# Patient Record
Sex: MALE | HISPANIC OR LATINO | Employment: FULL TIME | URBAN - METROPOLITAN AREA
[De-identification: names, ages, dates, MRNs, and addresses within clinical notes are randomized per-mention and may not be internally consistent; named-entity substitution may affect disease eponyms.]

---

## 2021-09-16 ENCOUNTER — HOSPITAL ENCOUNTER (OUTPATIENT)
Facility: MEDICAL CENTER | Age: 26
End: 2021-09-16
Attending: PHYSICIAN ASSISTANT
Payer: COMMERCIAL

## 2021-09-16 ENCOUNTER — OFFICE VISIT (OUTPATIENT)
Dept: URGENT CARE | Facility: CLINIC | Age: 26
End: 2021-09-16
Payer: COMMERCIAL

## 2021-09-16 VITALS
OXYGEN SATURATION: 96 % | DIASTOLIC BLOOD PRESSURE: 76 MMHG | SYSTOLIC BLOOD PRESSURE: 130 MMHG | WEIGHT: 286 LBS | BODY MASS INDEX: 44.89 KG/M2 | HEART RATE: 99 BPM | RESPIRATION RATE: 16 BRPM | TEMPERATURE: 99 F | HEIGHT: 67 IN

## 2021-09-16 DIAGNOSIS — J98.8 RTI (RESPIRATORY TRACT INFECTION): ICD-10-CM

## 2021-09-16 DIAGNOSIS — R05.9 COUGH: ICD-10-CM

## 2021-09-16 LAB
COVID ORDER STATUS COVID19: NORMAL
SARS-COV-2 RNA RESP QL NAA+PROBE: DETECTED
SPECIMEN SOURCE: ABNORMAL

## 2021-09-16 PROCEDURE — 99204 OFFICE O/P NEW MOD 45 MIN: CPT | Performed by: PHYSICIAN ASSISTANT

## 2021-09-16 PROCEDURE — U0003 INFECTIOUS AGENT DETECTION BY NUCLEIC ACID (DNA OR RNA); SEVERE ACUTE RESPIRATORY SYNDROME CORONAVIRUS 2 (SARS-COV-2) (CORONAVIRUS DISEASE [COVID-19]), AMPLIFIED PROBE TECHNIQUE, MAKING USE OF HIGH THROUGHPUT TECHNOLOGIES AS DESCRIBED BY CMS-2020-01-R: HCPCS

## 2021-09-16 PROCEDURE — U0005 INFEC AGEN DETEC AMPLI PROBE: HCPCS

## 2021-09-16 RX ORDER — BENZONATATE 100 MG/1
100 CAPSULE ORAL 3 TIMES DAILY PRN
Qty: 60 CAPSULE | Refills: 0 | Status: SHIPPED | OUTPATIENT
Start: 2021-09-16 | End: 2021-11-11

## 2021-09-16 RX ORDER — ALBUTEROL SULFATE 90 UG/1
2 AEROSOL, METERED RESPIRATORY (INHALATION) EVERY 6 HOURS PRN
Qty: 8.5 G | Refills: 0 | Status: SHIPPED | OUTPATIENT
Start: 2021-09-16 | End: 2021-11-11

## 2021-09-16 RX ORDER — AZITHROMYCIN 250 MG/1
TABLET, FILM COATED ORAL
Qty: 6 TABLET | Refills: 0 | Status: SHIPPED | OUTPATIENT
Start: 2021-09-16 | End: 2021-11-11

## 2021-09-16 ASSESSMENT — ENCOUNTER SYMPTOMS
DIZZINESS: 0
HEADACHES: 0
DIARRHEA: 1
MYALGIAS: 0
SHORTNESS OF BREATH: 0
RHINORRHEA: 1
PALPITATIONS: 0
CHILLS: 1
VOMITING: 1
SORE THROAT: 0
WHEEZING: 0
COUGH: 1
FEVER: 1

## 2021-09-16 NOTE — PROGRESS NOTES
Subjective     Angelito Rodriguez is a 26 y.o. male who presents with Cough (coughing up something yellow/orange, x1 week )            Cough  This is a new problem. The current episode started in the past 7 days. The problem has been unchanged. The problem occurs every few minutes. The cough is productive of sputum. Associated symptoms include chills, a fever, nasal congestion and rhinorrhea. Pertinent negatives include no chest pain, ear pain, headaches, myalgias, sore throat, shortness of breath or wheezing. He has tried OTC cough suppressant for the symptoms. The treatment provided mild relief. His past medical history is significant for pneumonia. There is no history of asthma.       PMH:  has a past medical history of Cold and Tonsillitis.  MEDS:   Current Outpatient Medications:   •  azithromycin (ZITHROMAX) 250 MG Tab, Z-gio, Use as directed., Disp: 6 Tablet, Rfl: 0  •  albuterol 108 (90 Base) MCG/ACT Aero Soln inhalation aerosol, Inhale 2 Puffs every 6 hours as needed for Shortness of Breath., Disp: 8.5 g, Rfl: 0  •  benzonatate (TESSALON) 100 MG Cap, Take 1 Capsule by mouth 3 times a day as needed for Cough., Disp: 60 Capsule, Rfl: 0  •  Guaifenesin-Codeine (CHERATUSSIN AC PO), Take  by mouth as needed., Disp: , Rfl:   •  AMOXICILLIN PO, Take  by mouth 2 Times a Day., Disp: , Rfl:   ALLERGIES: No Known Allergies  SURGHX:   Past Surgical History:   Procedure Laterality Date   • TONSILLECTOMY AND ADENOIDECTOMY  2/3/2011    [facility]   • EXAM UNDER ANESTHESIA  2/3/2011    [facility]     SOCHX:  reports that he has never smoked. He does not have any smokeless tobacco history on file. He reports that he does not drink alcohol and does not use drugs.  FH: family history includes Cancer in his unknown relative; Hypertension in his unknown relative; Stroke in his unknown relative.    Review of Systems  "  Constitutional: Positive for chills, fever and malaise/fatigue.   HENT: Positive for rhinorrhea. Negative for congestion, ear pain and sore throat.    Respiratory: Positive for cough. Negative for shortness of breath and wheezing.    Cardiovascular: Negative for chest pain, palpitations and leg swelling.   Gastrointestinal: Positive for diarrhea and vomiting.   Musculoskeletal: Negative for myalgias.   Neurological: Negative for dizziness and headaches.       Medications, Allergies, and current problem list reviewed today in Epic           Objective     /76 (BP Location: Left arm, Patient Position: Sitting, BP Cuff Size: Adult long)   Pulse 99   Temp 37.2 °C (99 °F) (Temporal)   Resp 16   Ht 1.702 m (5' 7\")   Wt (!) 130 kg (286 lb)   SpO2 96%   BMI 44.79 kg/m²      Physical Exam  Vitals and nursing note reviewed.   Constitutional:       General: He is not in acute distress.     Appearance: Normal appearance. He is well-developed. He is not ill-appearing, toxic-appearing or diaphoretic.   HENT:      Head: Normocephalic and atraumatic.      Right Ear: Tympanic membrane, ear canal and external ear normal.      Left Ear: Tympanic membrane, ear canal and external ear normal.      Nose: Nose normal. No congestion or rhinorrhea.      Mouth/Throat:      Mouth: Mucous membranes are moist.      Pharynx: Oropharynx is clear. No oropharyngeal exudate or posterior oropharyngeal erythema.   Eyes:      General:         Right eye: No discharge.         Left eye: No discharge.      Conjunctiva/sclera: Conjunctivae normal.   Cardiovascular:      Rate and Rhythm: Normal rate and regular rhythm.      Pulses: Normal pulses.      Heart sounds: Normal heart sounds. No murmur heard.     Pulmonary:      Effort: Pulmonary effort is normal. No respiratory distress.      Breath sounds: Normal breath sounds. No wheezing, rhonchi or rales.   Chest:      Chest wall: No tenderness.   Musculoskeletal:         General: No swelling or " tenderness.      Cervical back: Normal range of motion and neck supple.      Right lower leg: No edema.      Left lower leg: No edema.   Lymphadenopathy:      Cervical: No cervical adenopathy.   Skin:     General: Skin is warm and dry.   Neurological:      Mental Status: He is alert and oriented to person, place, and time.   Psychiatric:         Mood and Affect: Mood normal.         Behavior: Behavior normal.         Thought Content: Thought content normal.         Judgment: Judgment normal.                             Assessment & Plan        1. Cough  albuterol 108 (90 Base) MCG/ACT Aero Soln inhalation aerosol    benzonatate (TESSALON) 100 MG Cap    SARS-CoV-2, PCR (In-House): Collect NP OR nasal swab in VTM   2. RTI (respiratory tract infection)  azithromycin (ZITHROMAX) 250 MG Tab    albuterol 108 (90 Base) MCG/ACT Aero Soln inhalation aerosol    benzonatate (TESSALON) 100 MG Cap     Productive cough for 1 week.  No significant shortness of breath, wheezing, chest pain.  Started with fevers, vomiting and diarrhea but those symptoms have resolved.  No loss of taste or smell.  PO2 96% vital signs normal.  Lungs clear bilateral without wheezes rhonchi or rales.  Start albuterol and perles now.  Covid testing initiated.  If Covid testing negative he may begin antibiotic for bacterial respiratory tract infection.    Patient was given a contingent antibiotic prescription to fill and use as directed if symptoms progressed as discussed and agreed upon.    Return to clinic or go to ED if symptoms worsen or persist. Indications for ED discussed at length. Patient/Parent/Guardian voices understanding. Follow-up with your primary care provider in 3-5 days. Red flag symptoms discussed. All side effects of medication discussed including allergic response, GI upset, tendon injury, rash, sedation etc.    Please note that this dictation was created using voice recognition software. I have made every reasonable attempt to  correct obvious errors, but I expect that there are errors of grammar and possibly content that I did not discover before finalizing the note.

## 2021-11-11 ENCOUNTER — APPOINTMENT (OUTPATIENT)
Dept: RADIOLOGY | Facility: MEDICAL CENTER | Age: 26
End: 2021-11-11
Attending: EMERGENCY MEDICINE
Payer: COMMERCIAL

## 2021-11-11 ENCOUNTER — APPOINTMENT (OUTPATIENT)
Dept: CARDIOLOGY | Facility: MEDICAL CENTER | Age: 26
End: 2021-11-11
Attending: EMERGENCY MEDICINE
Payer: COMMERCIAL

## 2021-11-11 ENCOUNTER — HOSPITAL ENCOUNTER (OUTPATIENT)
Facility: MEDICAL CENTER | Age: 26
End: 2021-11-13
Attending: EMERGENCY MEDICINE | Admitting: STUDENT IN AN ORGANIZED HEALTH CARE EDUCATION/TRAINING PROGRAM
Payer: COMMERCIAL

## 2021-11-11 DIAGNOSIS — I40.9 ACUTE MYOCARDITIS, UNSPECIFIED MYOCARDITIS TYPE: ICD-10-CM

## 2021-11-11 DIAGNOSIS — R07.9 CHEST PAIN, UNSPECIFIED TYPE: ICD-10-CM

## 2021-11-11 PROBLEM — E66.01 CLASS 3 SEVERE OBESITY IN ADULT (HCC): Status: ACTIVE | Noted: 2021-11-11

## 2021-11-11 PROBLEM — I40.8 OTHER ACUTE MYOCARDITIS: Status: ACTIVE | Noted: 2021-11-11

## 2021-11-11 PROBLEM — Z86.16 HISTORY OF COVID-19: Status: ACTIVE | Noted: 2021-11-11

## 2021-11-11 LAB
ALBUMIN SERPL BCP-MCNC: 4.6 G/DL (ref 3.2–4.9)
ALBUMIN/GLOB SERPL: 1.5 G/DL
ALP SERPL-CCNC: 66 U/L (ref 30–99)
ALT SERPL-CCNC: 44 U/L (ref 2–50)
AMPHET UR QL SCN: NEGATIVE
ANION GAP SERPL CALC-SCNC: 13 MMOL/L (ref 7–16)
AST SERPL-CCNC: 44 U/L (ref 12–45)
BARBITURATES UR QL SCN: NEGATIVE
BASOPHILS # BLD AUTO: 0.5 % (ref 0–1.8)
BASOPHILS # BLD: 0.04 K/UL (ref 0–0.12)
BENZODIAZ UR QL SCN: NEGATIVE
BILIRUB SERPL-MCNC: 0.4 MG/DL (ref 0.1–1.5)
BUN SERPL-MCNC: 10 MG/DL (ref 8–22)
BZE UR QL SCN: NEGATIVE
CALCIUM SERPL-MCNC: 9.1 MG/DL (ref 8.5–10.5)
CANNABINOIDS UR QL SCN: NEGATIVE
CHLORIDE SERPL-SCNC: 102 MMOL/L (ref 96–112)
CO2 SERPL-SCNC: 26 MMOL/L (ref 20–33)
CREAT SERPL-MCNC: 0.64 MG/DL (ref 0.5–1.4)
D DIMER PPP IA.FEU-MCNC: 0.32 UG/ML (FEU) (ref 0–0.5)
EKG IMPRESSION: NORMAL
EOSINOPHIL # BLD AUTO: 0.17 K/UL (ref 0–0.51)
EOSINOPHIL NFR BLD: 2 % (ref 0–6.9)
ERYTHROCYTE [DISTWIDTH] IN BLOOD BY AUTOMATED COUNT: 41.5 FL (ref 35.9–50)
GLOBULIN SER CALC-MCNC: 3.1 G/DL (ref 1.9–3.5)
GLUCOSE SERPL-MCNC: 138 MG/DL (ref 65–99)
HCT VFR BLD AUTO: 44.9 % (ref 42–52)
HGB BLD-MCNC: 15.4 G/DL (ref 14–18)
IMM GRANULOCYTES # BLD AUTO: 0.04 K/UL (ref 0–0.11)
IMM GRANULOCYTES NFR BLD AUTO: 0.5 % (ref 0–0.9)
LV EJECT FRACT  99904: 55
LV EJECT FRACT MOD 2C 99903: 62.79
LV EJECT FRACT MOD 4C 99902: 69.87
LV EJECT FRACT MOD BP 99901: 67.84
LYMPHOCYTES # BLD AUTO: 1.83 K/UL (ref 1–4.8)
LYMPHOCYTES NFR BLD: 21.5 % (ref 22–41)
MCH RBC QN AUTO: 29.9 PG (ref 27–33)
MCHC RBC AUTO-ENTMCNC: 34.3 G/DL (ref 33.7–35.3)
MCV RBC AUTO: 87.2 FL (ref 81.4–97.8)
METHADONE UR QL SCN: NEGATIVE
MONOCYTES # BLD AUTO: 1.02 K/UL (ref 0–0.85)
MONOCYTES NFR BLD AUTO: 12 % (ref 0–13.4)
NEUTROPHILS # BLD AUTO: 5.4 K/UL (ref 1.82–7.42)
NEUTROPHILS NFR BLD: 63.5 % (ref 44–72)
NRBC # BLD AUTO: 0 K/UL
NRBC BLD-RTO: 0 /100 WBC
NT-PROBNP SERPL IA-MCNC: 204 PG/ML (ref 0–125)
OPIATES UR QL SCN: NEGATIVE
OXYCODONE UR QL SCN: NEGATIVE
PCP UR QL SCN: NEGATIVE
PLATELET # BLD AUTO: 173 K/UL (ref 164–446)
PMV BLD AUTO: 10.9 FL (ref 9–12.9)
POTASSIUM SERPL-SCNC: 3.9 MMOL/L (ref 3.6–5.5)
PROPOXYPH UR QL SCN: NEGATIVE
PROT SERPL-MCNC: 7.7 G/DL (ref 6–8.2)
RBC # BLD AUTO: 5.15 M/UL (ref 4.7–6.1)
SODIUM SERPL-SCNC: 141 MMOL/L (ref 135–145)
TROPONIN T SERPL-MCNC: 666 NG/L (ref 6–19)
TROPONIN T SERPL-MCNC: 834 NG/L (ref 6–19)
WBC # BLD AUTO: 8.5 K/UL (ref 4.8–10.8)

## 2021-11-11 PROCEDURE — 93005 ELECTROCARDIOGRAM TRACING: CPT

## 2021-11-11 PROCEDURE — 83880 ASSAY OF NATRIURETIC PEPTIDE: CPT

## 2021-11-11 PROCEDURE — 99220 PR INITIAL OBSERVATION CARE,LEVL III: CPT | Performed by: STUDENT IN AN ORGANIZED HEALTH CARE EDUCATION/TRAINING PROGRAM

## 2021-11-11 PROCEDURE — 84484 ASSAY OF TROPONIN QUANT: CPT

## 2021-11-11 PROCEDURE — 93306 TTE W/DOPPLER COMPLETE: CPT

## 2021-11-11 PROCEDURE — 85379 FIBRIN DEGRADATION QUANT: CPT

## 2021-11-11 PROCEDURE — 99204 OFFICE O/P NEW MOD 45 MIN: CPT | Performed by: INTERNAL MEDICINE

## 2021-11-11 PROCEDURE — 99285 EMERGENCY DEPT VISIT HI MDM: CPT

## 2021-11-11 PROCEDURE — 93306 TTE W/DOPPLER COMPLETE: CPT | Mod: 26 | Performed by: INTERNAL MEDICINE

## 2021-11-11 PROCEDURE — 93005 ELECTROCARDIOGRAM TRACING: CPT | Performed by: EMERGENCY MEDICINE

## 2021-11-11 PROCEDURE — 85025 COMPLETE CBC W/AUTO DIFF WBC: CPT

## 2021-11-11 PROCEDURE — 80307 DRUG TEST PRSMV CHEM ANLYZR: CPT

## 2021-11-11 PROCEDURE — G0378 HOSPITAL OBSERVATION PER HR: HCPCS

## 2021-11-11 PROCEDURE — 71045 X-RAY EXAM CHEST 1 VIEW: CPT

## 2021-11-11 PROCEDURE — 80053 COMPREHEN METABOLIC PANEL: CPT

## 2021-11-11 PROCEDURE — 36415 COLL VENOUS BLD VENIPUNCTURE: CPT

## 2021-11-11 RX ORDER — LABETALOL HYDROCHLORIDE 5 MG/ML
10 INJECTION, SOLUTION INTRAVENOUS EVERY 4 HOURS PRN
Status: DISCONTINUED | OUTPATIENT
Start: 2021-11-11 | End: 2021-11-13 | Stop reason: HOSPADM

## 2021-11-11 RX ORDER — IBUPROFEN 200 MG
400 TABLET ORAL EVERY 6 HOURS PRN
Status: ON HOLD | COMMUNITY
End: 2021-11-13

## 2021-11-11 RX ORDER — POLYETHYLENE GLYCOL 3350 17 G/17G
1 POWDER, FOR SOLUTION ORAL
Status: DISCONTINUED | OUTPATIENT
Start: 2021-11-11 | End: 2021-11-13 | Stop reason: HOSPADM

## 2021-11-11 RX ORDER — BISACODYL 10 MG
10 SUPPOSITORY, RECTAL RECTAL
Status: DISCONTINUED | OUTPATIENT
Start: 2021-11-11 | End: 2021-11-13 | Stop reason: HOSPADM

## 2021-11-11 RX ORDER — ONDANSETRON 4 MG/1
4 TABLET, ORALLY DISINTEGRATING ORAL EVERY 4 HOURS PRN
Status: DISCONTINUED | OUTPATIENT
Start: 2021-11-11 | End: 2021-11-13 | Stop reason: HOSPADM

## 2021-11-11 RX ORDER — ACETAMINOPHEN 500 MG
1000 TABLET ORAL EVERY 6 HOURS PRN
COMMUNITY
End: 2022-03-11

## 2021-11-11 RX ORDER — ONDANSETRON 2 MG/ML
4 INJECTION INTRAMUSCULAR; INTRAVENOUS EVERY 4 HOURS PRN
Status: DISCONTINUED | OUTPATIENT
Start: 2021-11-11 | End: 2021-11-13 | Stop reason: HOSPADM

## 2021-11-11 RX ORDER — ENALAPRILAT 1.25 MG/ML
1.25 INJECTION INTRAVENOUS EVERY 6 HOURS PRN
Status: DISCONTINUED | OUTPATIENT
Start: 2021-11-11 | End: 2021-11-13 | Stop reason: HOSPADM

## 2021-11-11 RX ORDER — ACETAMINOPHEN 325 MG/1
650 TABLET ORAL EVERY 6 HOURS PRN
Status: DISCONTINUED | OUTPATIENT
Start: 2021-11-11 | End: 2021-11-13 | Stop reason: HOSPADM

## 2021-11-11 RX ORDER — CLONIDINE HYDROCHLORIDE 0.1 MG/1
0.1 TABLET ORAL EVERY 6 HOURS PRN
Status: DISCONTINUED | OUTPATIENT
Start: 2021-11-11 | End: 2021-11-13 | Stop reason: HOSPADM

## 2021-11-11 RX ORDER — ALBUTEROL SULFATE 90 UG/1
2 AEROSOL, METERED RESPIRATORY (INHALATION) EVERY 6 HOURS PRN
COMMUNITY
End: 2021-12-09

## 2021-11-11 RX ORDER — AMOXICILLIN 250 MG
2 CAPSULE ORAL 2 TIMES DAILY
Status: DISCONTINUED | OUTPATIENT
Start: 2021-11-11 | End: 2021-11-13 | Stop reason: HOSPADM

## 2021-11-11 ASSESSMENT — COGNITIVE AND FUNCTIONAL STATUS - GENERAL
SUGGESTED CMS G CODE MODIFIER DAILY ACTIVITY: CH
SUGGESTED CMS G CODE MODIFIER MOBILITY: CH
MOBILITY SCORE: 24
DAILY ACTIVITIY SCORE: 24

## 2021-11-11 ASSESSMENT — PATIENT HEALTH QUESTIONNAIRE - PHQ9
2. FEELING DOWN, DEPRESSED, IRRITABLE, OR HOPELESS: NOT AT ALL
1. LITTLE INTEREST OR PLEASURE IN DOING THINGS: NOT AT ALL
SUM OF ALL RESPONSES TO PHQ9 QUESTIONS 1 AND 2: 0

## 2021-11-11 ASSESSMENT — ENCOUNTER SYMPTOMS
WEAKNESS: 1
MYALGIAS: 1
FEVER: 1

## 2021-11-11 NOTE — ED TRIAGE NOTES
Vitals:    11/11/21 1024   BP: 129/81   Pulse: 89   Resp: 18   Temp: 36.3 °C (97.4 °F)   SpO2: 94%     Chief Complaint   Patient presents with   • Chest Pain     after receiving covid vaccine     Pt got the Moderna covid vaccine on Tuesday. He felt pretty ill for a couple days afterward but now has been having intermittent chest pain that he describes as sharp/shooting pain. Pt currently is c/o of a dull ache in his chest.     EKG completed.

## 2021-11-11 NOTE — ASSESSMENT & PLAN NOTE
BMI 45  Life style modifications including healthy plant based diet and regular exercise recommended

## 2021-11-11 NOTE — ED NOTES
"Med rec completed per Pt at bedside.  Allergies reviewed with Pt. No known drug allergies.  Pt denies using any prescription medications aside from an albuterol rescue inhaler, which he states he was \"only prescribed for COVID.\"  No oral antibiotics in last 30 days.  Pt's preferred pharmacy: [facility] on [address]  "

## 2021-11-11 NOTE — ED PROVIDER NOTES
ED Provider Note    ER PROVIDER NOTE        CHIEF COMPLAINT  Chief Complaint   Patient presents with   • Chest Pain     after receiving covid vaccine       HPI  Angelito Rodriguez is a 26 y.o. male who presents to the emergency department complaining of chest pain.  Patient reports he began having some chest pain yesterday, did feel feverish as well, this resolved through the day however the chest pain then returned yesterday evening.  He describes it as both a sharp and pressure pain that is central, there is no radiation, it is not positional, does not seem to matter if he lays down or sits up.  It is not pleuritic, and does not seem to be exertional either.  He states it seemed to actually be getting better last night and then returned again this morning.  And is starting to improve again now.  He did receive the Covid vaccination with Moderna on Tuesday, shot #1    No cough, congestion or shortness of breath.  No leg pain or swelling.  No abdominal pain, nausea vomiting or diarrhea    REVIEW OF SYSTEMS  Pertinent positives include chest pain. Pertinent negatives include no vomiting. See HPI for details. All other systems reviewed and are negative.    PAST MEDICAL HISTORY   has a past medical history of Cold and Tonsillitis.    SURGICAL HISTORY   has a past surgical history that includes tonsillectomy and adenoidectomy (2/3/2011) and exam under anesthesia (2/3/2011).    FAMILY HISTORY  Family History   Problem Relation Age of Onset   • Hypertension Unknown    • Stroke Unknown    • Cancer Unknown        SOCIAL HISTORY  Social History     Socioeconomic History   • Marital status: Single     Spouse name: Not on file   • Number of children: Not on file   • Years of education: Not on file   • Highest education level: Not on file   Occupational History   • Not on file   Tobacco Use   • Smoking status: Never Smoker   • Smokeless tobacco: Not on file   Substance and Sexual Activity   • Alcohol use: No   • Drug use: No  "  • Sexual activity: Not on file   Other Topics Concern   • Not on file   Social History Narrative   • Not on file     Social Determinants of Health     Financial Resource Strain:    • Difficulty of Paying Living Expenses: Not on file   Food Insecurity:    • Worried About Running Out of Food in the Last Year: Not on file   • Ran Out of Food in the Last Year: Not on file   Transportation Needs:    • Lack of Transportation (Medical): Not on file   • Lack of Transportation (Non-Medical): Not on file   Physical Activity:    • Days of Exercise per Week: Not on file   • Minutes of Exercise per Session: Not on file   Stress:    • Feeling of Stress : Not on file   Social Connections:    • Frequency of Communication with Friends and Family: Not on file   • Frequency of Social Gatherings with Friends and Family: Not on file   • Attends Hindu Services: Not on file   • Active Member of Clubs or Organizations: Not on file   • Attends Club or Organization Meetings: Not on file   • Marital Status: Not on file   Intimate Partner Violence:    • Fear of Current or Ex-Partner: Not on file   • Emotionally Abused: Not on file   • Physically Abused: Not on file   • Sexually Abused: Not on file   Housing Stability:    • Unable to Pay for Housing in the Last Year: Not on file   • Number of Places Lived in the Last Year: Not on file   • Unstable Housing in the Last Year: Not on file      Social History     Substance and Sexual Activity   Drug Use No       CURRENT MEDICATIONS  Home Medications     Reviewed by Marleny Sneed R.N. (Registered Nurse) on 11/11/21 at 1036  Med List Status: Complete   Medication Last Dose Status        Patient Iván Taking any Medications                       ALLERGIES  No Known Allergies    PHYSICAL EXAM  VITAL SIGNS: /67   Pulse 91   Temp 36.3 °C (97.4 °F) (Temporal)   Resp 16   Ht 1.702 m (5' 7\")   Wt (!) 133 kg (292 lb 8.8 oz)   SpO2 99%   BMI 45.82 kg/m²   Pulse ox interpretation: I " interpret this pulse ox as normal.    Constitutional: Alert in no apparent distress.  HENT: No signs of trauma, Bilateral external ears normal, Nose normal.   Eyes: Pupils are equal and reactive, Conjunctiva normal, Non-icteric.   Neck: Normal range of motion, No tenderness, Supple, No stridor.   Lymphatic: No lymphadenopathy noted.   Cardiovascular: Regular rate and rhythm, no murmurs.   Thorax & Lungs: Normal breath sounds, No respiratory distress, No wheezing, No chest tenderness.   Abdomen: Bowel sounds normal, Soft, No tenderness, No masses, No pulsatile masses. No peritoneal signs.  Skin: Warm, Dry, No erythema, No rash.   Back: No bony tenderness, No CVA tenderness.   Extremities: Intact distal pulses, No edema, No tenderness, No cyanosis, Negative Zana's sign.  Musculoskeletal: Good range of motion in all major joints. No tenderness to palpation or major deformities noted.   Neurologic: Alert , Normal motor function, Normal sensory function, No focal deficits noted.   Psychiatric: Affect normal, Judgment normal, Mood normal.     DIAGNOSTIC STUDIES / PROCEDURES    EKG  Results for orders placed or performed during the hospital encounter of 11/11/21   CBC with Differential   Result Value Ref Range    WBC 8.5 4.8 - 10.8 K/uL    RBC 5.15 4.70 - 6.10 M/uL    Hemoglobin 15.4 14.0 - 18.0 g/dL    Hematocrit 44.9 42.0 - 52.0 %    MCV 87.2 81.4 - 97.8 fL    MCH 29.9 27.0 - 33.0 pg    MCHC 34.3 33.7 - 35.3 g/dL    RDW 41.5 35.9 - 50.0 fL    Platelet Count 173 164 - 446 K/uL    MPV 10.9 9.0 - 12.9 fL    Neutrophils-Polys 63.50 44.00 - 72.00 %    Lymphocytes 21.50 (L) 22.00 - 41.00 %    Monocytes 12.00 0.00 - 13.40 %    Eosinophils 2.00 0.00 - 6.90 %    Basophils 0.50 0.00 - 1.80 %    Immature Granulocytes 0.50 0.00 - 0.90 %    Nucleated RBC 0.00 /100 WBC    Neutrophils (Absolute) 5.40 1.82 - 7.42 K/uL    Lymphs (Absolute) 1.83 1.00 - 4.80 K/uL    Monos (Absolute) 1.02 (H) 0.00 - 0.85 K/uL    Eos (Absolute) 0.17 0.00 -  0.51 K/uL    Baso (Absolute) 0.04 0.00 - 0.12 K/uL    Immature Granulocytes (abs) 0.04 0.00 - 0.11 K/uL    NRBC (Absolute) 0.00 K/uL   Complete Metabolic Panel (CMP)   Result Value Ref Range    Sodium 141 135 - 145 mmol/L    Potassium 3.9 3.6 - 5.5 mmol/L    Chloride 102 96 - 112 mmol/L    Co2 26 20 - 33 mmol/L    Anion Gap 13.0 7.0 - 16.0    Glucose 138 (H) 65 - 99 mg/dL    Bun 10 8 - 22 mg/dL    Creatinine 0.64 0.50 - 1.40 mg/dL    Calcium 9.1 8.5 - 10.5 mg/dL    AST(SGOT) 44 12 - 45 U/L    ALT(SGPT) 44 2 - 50 U/L    Alkaline Phosphatase 66 30 - 99 U/L    Total Bilirubin 0.4 0.1 - 1.5 mg/dL    Albumin 4.6 3.2 - 4.9 g/dL    Total Protein 7.7 6.0 - 8.2 g/dL    Globulin 3.1 1.9 - 3.5 g/dL    A-G Ratio 1.5 g/dL   Troponin   Result Value Ref Range    Troponin T 666 (H) 6 - 19 ng/L   ESTIMATED GFR   Result Value Ref Range    GFR If African American >60 >60 mL/min/1.73 m 2    GFR If Non African American >60 >60 mL/min/1.73 m 2   EKG (NOW)   Result Value Ref Range    Report       [facility] Emergency Dept.    Test Date:  2021  Pt Name:    JETT SHUKLA        Department: ER  MRN:        [id number]                      Room:  Gender:     Male                         Technician: [id number]  :        1995                   Requested By:ER TRIAGE PROTOCOL  Order #:    [id number]                    Reading MD: LIAT BERNAL MD    Measurements  Intervals                                Axis  Rate:       84                           P:          37  WI:         144                          QRS:        14  QRSD:       86                           T:          15  QT:         348  QTc:        412    Interpretive Statements  SINUS RHYTHM  No previous ECG available for comparison  Electronically Signed On 2021 13:05:43 PST by LIAT BERNAL MD           RADIOLOGY  DX-CHEST-PORTABLE (1 VIEW)   Final Result      No acute cardiopulmonary abnormality.         EC-ECHOCARDIOGRAM COMPLETE W/O CONT     (Results Pending)     The radiologist's interpretation of all radiological studies have been reviewed and images independently viewed by me.    COURSE & MEDICAL DECISION MAKING  Nursing notes, VS, PMSFHx reviewed in chart.    12:56 PM Patient seen and examined at bedside.  Patient had labs drawn in triage, troponin at 666, ECG is unremarkable, given the symptoms seems to likely be myocarditis potentially from his vaccination      1:05 PM  Cardiology is paged, ordered for echo  I discussed the case with Dr. Muñiz,  Agrees with admission of the patient      1:31 PM  discussed results and plan for admission to which the patient is agreeable      Discussed case with hospitalist for admission      Decision Making:  This is a 26 y.o. male presenting with chest pain.  This appears to be myocarditis likely related to his recent Moderna vaccination.  Patient is overall well-appearing, afebrile without any findings of heart failure and I do not suspect infectious cause of her symptoms.  Given his age, the nature of his symptoms, and his ECG this does not appear to be ischemic.  The pain does not seem to be positional or consistent with pericarditis and the elevation in his troponin would argue against this as well.    Patient is admitted in stable condition    FINAL IMPRESSION  1. Chest pain, unspecified type    2. Acute myocarditis, unspecified myocarditis type         The note accurately reflects work and decisions made by me.  Efrain Lucas M.D.  11/11/2021  1:32 PM

## 2021-11-11 NOTE — ASSESSMENT & PLAN NOTE
Post 1st dose of Moderna vaccine.   Coming with chest pain elevated troponin  no acute ST-T changes.   echo with EF 55% and no other acute abnormalities or pericardial effusions.   D-Dimer and UDS wnls.  No new exposures or medications.   Likely covid -vaccine myocarditis, vs post infectious  Supportive pain control with Tylenol, avoid to use NSAID or steroid or colchicine per cardiology  Troponin is trending up, most recent troponin and 1600s  -continue to trend troponins. continue tele to monitor for arhythmias.

## 2021-11-11 NOTE — CONSULTS
Reason for Consult:  Asked by Dr Efrain Lucas M.D. to see this patient with  myocarditis  Patient's PCP: Pcp Not In Computer    CC:   Chief Complaint   Patient presents with   • Chest Pain     after receiving covid vaccine       HPI: This is a 26-year-old gentleman with no prior cardiac history no family history of heart disease who presents with chest pains on 2 occasions after having received the Moderna Covid vaccination 3 days ago he also has a history of Covid.  He describes an epigastric severe chest pain that worsened when he stood up he had some slight dizziness with this as it occurred on 2 occasions he presented he has been taking Tylenol to cover fevers after vaccination he has never had a reaction to other vaccines in the past    Medications / Drug list prior to admission:  No current facility-administered medications on file prior to encounter.     No current outpatient medications on file prior to encounter.       Current list of administered Medications:  No current facility-administered medications for this encounter.  No current outpatient medications on file.    Past Medical History:   Diagnosis Date   • Cold    • Tonsillitis        Past Surgical History:   Procedure Laterality Date   • TONSILLECTOMY AND ADENOIDECTOMY  2/3/2011    [facility]   • EXAM UNDER ANESTHESIA  2/3/2011    Performed by [facility]LE [facility]       Family History   Problem Relation Age of Onset   • Hypertension Unknown    • Stroke Unknown    • Cancer Unknown      Patient family history was personally reviewed, no pertinent family history to current presentation    Social History     Tobacco Use   • Smoking status: Never Smoker   • Smokeless tobacco: Not on file   Substance Use Topics   • Alcohol use: No   • Drug use: No       ALLERGIES:  No Known Allergies    Review of systems:  A complete review of symptoms was reviewed with patient  This is  "reviewed in H&P and PMH. ALL OTHERS reviewed and negative    Physical exam:  Patient Vitals for the past 24 hrs:   BP Temp Temp src Pulse Resp SpO2 Height Weight   21 1249 -- -- -- 91 16 99 % -- --   21 1248 131/67 -- -- -- -- -- -- --   21 1034 -- -- -- -- -- -- -- (!) 133 kg (292 lb 8.8 oz)   21 1024 129/81 36.3 °C (97.4 °F) Temporal 89 18 94 % 1.702 m (5' 7\") --     General: No acute distress.   EYES: no jaundice  HEENT: OP clear   Neck:  No JVD.   CVS: Regular no rub  Resp: Normal respiratory effort  Abdomen: Soft, ND,  Skin: Grossly nothing acute no obvious rashes  Neurological: Alert, Moves all extremities, no cranial nerve defects on limited exam  Extremities: No edema. No cyanosis.       Data:  Laboratory studies personally reviewed by me:  Recent Results (from the past 24 hour(s))   EKG (NOW)    Collection Time: 21 10:32 AM   Result Value Ref Range    Report       [facility] Emergency Dept.    Test Date:  2021  Pt Name:    JETT SHUKAL        Department: ER  MRN:        [id number]                      Room:  Gender:     Male                         Technician: [id number]  :        1995                   Requested By:ER TRIAGE PROTOCOL  Order #:    [id number]                    Reading MD: LIAT BERNAL MD    Measurements  Intervals                                Axis  Rate:       84                           P:          37  CA:         144                          QRS:        14  QRSD:       86                           T:          15  QT:         348  QTc:        412    Interpretive Statements  SINUS RHYTHM  No previous ECG available for comparison  Electronically Signed On 2021 13:05:43 PST by LIAT BERNAL MD     CBC with Differential    Collection Time: 21 10:46 AM   Result Value Ref Range    WBC 8.5 4.8 - 10.8 K/uL    RBC 5.15 4.70 - 6.10 M/uL    Hemoglobin 15.4 14.0 - 18.0 g/dL    Hematocrit 44.9 42.0 - 52.0 %    MCV " 87.2 81.4 - 97.8 fL    MCH 29.9 27.0 - 33.0 pg    MCHC 34.3 33.7 - 35.3 g/dL    RDW 41.5 35.9 - 50.0 fL    Platelet Count 173 164 - 446 K/uL    MPV 10.9 9.0 - 12.9 fL    Neutrophils-Polys 63.50 44.00 - 72.00 %    Lymphocytes 21.50 (L) 22.00 - 41.00 %    Monocytes 12.00 0.00 - 13.40 %    Eosinophils 2.00 0.00 - 6.90 %    Basophils 0.50 0.00 - 1.80 %    Immature Granulocytes 0.50 0.00 - 0.90 %    Nucleated RBC 0.00 /100 WBC    Neutrophils (Absolute) 5.40 1.82 - 7.42 K/uL    Lymphs (Absolute) 1.83 1.00 - 4.80 K/uL    Monos (Absolute) 1.02 (H) 0.00 - 0.85 K/uL    Eos (Absolute) 0.17 0.00 - 0.51 K/uL    Baso (Absolute) 0.04 0.00 - 0.12 K/uL    Immature Granulocytes (abs) 0.04 0.00 - 0.11 K/uL    NRBC (Absolute) 0.00 K/uL   Complete Metabolic Panel (CMP)    Collection Time: 11/11/21 10:46 AM   Result Value Ref Range    Sodium 141 135 - 145 mmol/L    Potassium 3.9 3.6 - 5.5 mmol/L    Chloride 102 96 - 112 mmol/L    Co2 26 20 - 33 mmol/L    Anion Gap 13.0 7.0 - 16.0    Glucose 138 (H) 65 - 99 mg/dL    Bun 10 8 - 22 mg/dL    Creatinine 0.64 0.50 - 1.40 mg/dL    Calcium 9.1 8.5 - 10.5 mg/dL    AST(SGOT) 44 12 - 45 U/L    ALT(SGPT) 44 2 - 50 U/L    Alkaline Phosphatase 66 30 - 99 U/L    Total Bilirubin 0.4 0.1 - 1.5 mg/dL    Albumin 4.6 3.2 - 4.9 g/dL    Total Protein 7.7 6.0 - 8.2 g/dL    Globulin 3.1 1.9 - 3.5 g/dL    A-G Ratio 1.5 g/dL   Troponin    Collection Time: 11/11/21 10:46 AM   Result Value Ref Range    Troponin T 666 (H) 6 - 19 ng/L   ESTIMATED GFR    Collection Time: 11/11/21 10:46 AM   Result Value Ref Range    GFR If African American >60 >60 mL/min/1.73 m 2    GFR If Non African American >60 >60 mL/min/1.73 m 2       Imaging:  DX-CHEST-PORTABLE (1 VIEW)   Final Result      No acute cardiopulmonary abnormality.         EC-ECHOCARDIOGRAM COMPLETE W/O CONT    (Results Pending)           EKG tracings personally reviewed by me sinus rhythm no acute changes    Echocardiogram images personally reviewed by me show  preserved ejection fraction no valvular disease    All pertinent features of laboratory and imaging reviewed including primary images where applicable      Principal Problem:    Other acute myocarditis - Moderna Covid Vaccine POA: Yes  Active Problems:    Pain in the chest POA: Unknown    Class 3 severe obesity in adult (HCC) POA: Unknown    History of COVID-19 POA: Unknown  Resolved Problems:    * No resolved hospital problems. *      Assessment / Plan:  Myocarditis due to Covid vaccination this is a rare complication without clear guidance on the treatment in general is just supportive care but given the degree of his troponin elevation will be worthwhile to monitor him with serial troponins to ensure they are stable if they are stable arrange for an outpatient troponin check in a week    Given his reaction to this Covid vaccination would not advise him to get the 2nd dose he understands this does not provide the same immunity but he did have Covid so he should be well immune.  Hopefully over time we have more guidance on safety and appropriateness of booster vaccination    If his troponin is stable by tomorrow he can be safely discharged    I personally discussed his case with  Dr Efrain Lucas M.D.      It is my pleasure to participate in the care of Mr. Jericho Rodriguez.  Please do not hesitate to contact me with questions or concerns.    Manpreet Muñiz MD PhD [facility]  Cardiologist [facility]    11/11/2021    Please note that this dictation was created using voice recognition software. There may be errors I did not discover before finalizing the note.

## 2021-11-11 NOTE — H&P
Hospital Medicine History & Physical Note    Date of Service  11/11/2021    Primary Care Physician  Pcp Not In Computer    Consultants  cardiology    Specialist Names: Dr. Muñiz    Code Status  Full Code    Chief Complaint  Chief Complaint   Patient presents with   • Chest Pain     after receiving covid vaccine       History of Presenting Illness  Angelito Rodriguez is a 26 y.o. male with past medical history of COVID in 9/2021, who presented 11/11/2021 with chest pain after receiving 1st dose of Moderna COVID vaccine on 11/9. Patient reports he starts to have chest pain, shoulder pain, myalgia one day after Moderna vaccine. Associated with fever and lethargy. He describes the chest pain as sharp and pressure pain, no radiation or positional. Denies nausea, vomiting, sob, hemoptysis.   He had COVID infection in 9/2021 with fever, coughing and hemoptysis. However he recovered without seeking medical attention.   Here labs remarkable for Trop 666. EKG reviewed NSR, no acute ST-T changes.   Cardiology Dr. Muñiz was consulted and stat Echo was done which shows normal EF in preliminary report.   Patient will be admitted for closely monitoring.     I discussed the plan of care with patient, family, bedside RN and cardiology and ERP.    Review of Systems  Review of Systems   Constitutional: Positive for fever and malaise/fatigue.   Cardiovascular: Positive for chest pain.   Musculoskeletal: Positive for joint pain and myalgias.   Neurological: Positive for weakness.   All other systems reviewed and are negative.      Past Medical History   has a past medical history of Cold and Tonsillitis.    Surgical History   has a past surgical history that includes tonsillectomy and adenoidectomy (2/3/2011) and exam under anesthesia (2/3/2011).     Family History  family history includes Cancer in his unknown relative; Hypertension in his unknown relative; Stroke in his unknown relative.   Family history reviewed with patient.  There is no family history that is pertinent to the chief complaint.     Social History   reports that he has never smoked. He does not have any smokeless tobacco history on file. He reports that he does not drink alcohol and does not use drugs.    Allergies  No Known Allergies    Medications  None       Physical Exam  Temp:  [36.3 °C (97.4 °F)] 36.3 °C (97.4 °F)  Pulse:  [89-91] 91  Resp:  [16-18] 16  BP: (129-131)/(67-81) 131/67  SpO2:  [94 %-99 %] 99 %  Blood Pressure: 131/67   Temperature: 36.3 °C (97.4 °F)   Pulse: 91   Respiration: 16   Pulse Oximetry: 99 %       Physical Exam  Vitals and nursing note reviewed.   Constitutional:       General: He is not in acute distress.     Appearance: Normal appearance. He is obese.   HENT:      Head: Normocephalic and atraumatic.      Mouth/Throat:      Pharynx: Oropharynx is clear.   Eyes:      Extraocular Movements: Extraocular movements intact.      Pupils: Pupils are equal, round, and reactive to light.   Neck:      Vascular: No carotid bruit.   Cardiovascular:      Rate and Rhythm: Normal rate and regular rhythm.      Pulses: Normal pulses.      Heart sounds: Normal heart sounds.   Pulmonary:      Effort: Pulmonary effort is normal. No respiratory distress.      Breath sounds: Normal breath sounds. No wheezing.   Abdominal:      General: Abdomen is flat. Bowel sounds are normal. There is no distension.      Palpations: Abdomen is soft. There is no mass.      Tenderness: There is no abdominal tenderness.   Musculoskeletal:         General: No swelling or tenderness. Normal range of motion.      Cervical back: Neck supple.   Skin:     General: Skin is warm and dry.   Neurological:      General: No focal deficit present.      Mental Status: He is alert and oriented to person, place, and time.   Psychiatric:         Mood and Affect: Mood normal.         Behavior: Behavior normal.         Laboratory:  Recent Labs     11/11/21  1046   WBC 8.5   RBC 5.15   HEMOGLOBIN 15.4    HEMATOCRIT 44.9   MCV 87.2   MCH 29.9   MCHC 34.3   RDW 41.5   PLATELETCT 173   MPV 10.9     Recent Labs     11/11/21  1046   SODIUM 141   POTASSIUM 3.9   CHLORIDE 102   CO2 26   GLUCOSE 138*   BUN 10   CREATININE 0.64   CALCIUM 9.1     Recent Labs     11/11/21  1046   ALTSGPT 44   ASTSGOT 44   ALKPHOSPHAT 66   TBILIRUBIN 0.4   GLUCOSE 138*         Recent Labs     11/11/21  1046   NTPROBNP 204*         Recent Labs     11/11/21  1046   TROPONINT 666*       Imaging:  EC-ECHOCARDIOGRAM COMPLETE W/O CONT         DX-CHEST-PORTABLE (1 VIEW)   Final Result      No acute cardiopulmonary abnormality.             X-Ray:  I have personally reviewed the images and compared with prior images.  EKG:  I have personally reviewed the images and compared with prior images.    Assessment/Plan:  I anticipate this patient is appropriate for observation status at this time.    * Pain in the chest  Assessment & Plan  Post 1st dose of Moderna vaccine. Trop 666, EKG NSR, no acute ST-T changes. C/w myocarditis  Stat echo ordered, pending final result    Check d-dimer, UDS  Trend trop  Supportive pain control with Tylenol, avoid to use NSAID or steroid or colchicine per cardiology  Cardiology Dr. Muñiz is consulted    History of COVID-19  Assessment & Plan  Hx of COVID infection in 9/2021    Class 3 severe obesity in adult (HCC)  Assessment & Plan  BMI 45  Life style modifications including healthy plant based diet and regular exercise recommended       VTE prophylaxis: enoxaparin ppx

## 2021-11-12 LAB
EKG IMPRESSION: NORMAL
TROPONIN T SERPL-MCNC: 1085 NG/L (ref 6–19)
TROPONIN T SERPL-MCNC: 1149 NG/L (ref 6–19)
TROPONIN T SERPL-MCNC: 1218 NG/L (ref 6–19)
TROPONIN T SERPL-MCNC: 833 NG/L (ref 6–19)

## 2021-11-12 PROCEDURE — 36415 COLL VENOUS BLD VENIPUNCTURE: CPT

## 2021-11-12 PROCEDURE — 700111 HCHG RX REV CODE 636 W/ 250 OVERRIDE (IP): Performed by: STUDENT IN AN ORGANIZED HEALTH CARE EDUCATION/TRAINING PROGRAM

## 2021-11-12 PROCEDURE — 700102 HCHG RX REV CODE 250 W/ 637 OVERRIDE(OP): Performed by: STUDENT IN AN ORGANIZED HEALTH CARE EDUCATION/TRAINING PROGRAM

## 2021-11-12 PROCEDURE — 93010 ELECTROCARDIOGRAM REPORT: CPT | Performed by: INTERNAL MEDICINE

## 2021-11-12 PROCEDURE — 99225 PR SUBSEQUENT OBSERVATION CARE,LEVEL II: CPT | Mod: GC | Performed by: HOSPITALIST

## 2021-11-12 PROCEDURE — 96372 THER/PROPH/DIAG INJ SC/IM: CPT

## 2021-11-12 PROCEDURE — 84484 ASSAY OF TROPONIN QUANT: CPT

## 2021-11-12 PROCEDURE — 93005 ELECTROCARDIOGRAM TRACING: CPT | Performed by: STUDENT IN AN ORGANIZED HEALTH CARE EDUCATION/TRAINING PROGRAM

## 2021-11-12 PROCEDURE — 99214 OFFICE O/P EST MOD 30 MIN: CPT | Performed by: INTERNAL MEDICINE

## 2021-11-12 PROCEDURE — A9270 NON-COVERED ITEM OR SERVICE: HCPCS | Performed by: STUDENT IN AN ORGANIZED HEALTH CARE EDUCATION/TRAINING PROGRAM

## 2021-11-12 PROCEDURE — G0378 HOSPITAL OBSERVATION PER HR: HCPCS

## 2021-11-12 RX ADMIN — ACETAMINOPHEN 650 MG: 325 TABLET, FILM COATED ORAL at 06:58

## 2021-11-12 RX ADMIN — ENOXAPARIN SODIUM 40 MG: 40 INJECTION SUBCUTANEOUS at 05:54

## 2021-11-12 ASSESSMENT — ENCOUNTER SYMPTOMS
NECK PAIN: 0
BACK PAIN: 0
APNEA: 0
CHEST TIGHTNESS: 0
FEVER: 0
EYE PAIN: 0
ORTHOPNEA: 0
SEIZURES: 0
SHORTNESS OF BREATH: 0
SENSORY CHANGE: 0
CHILLS: 0
FOCAL WEAKNESS: 0
BACK PAIN: 1
DEPRESSION: 0
CHOKING: 0
ABDOMINAL PAIN: 0
SORE THROAT: 0
BLOOD IN STOOL: 0
HEARTBURN: 0
MYALGIAS: 0
VOMITING: 0
HEMOPTYSIS: 0
COUGH: 0
WEAKNESS: 0
BLURRED VISION: 0
CONSTIPATION: 0
NERVOUS/ANXIOUS: 0
WEIGHT LOSS: 0
HEADACHES: 0
SINUS PAIN: 0
CLAUDICATION: 0
DIARRHEA: 0
DIZZINESS: 0
STRIDOR: 0
WHEEZING: 0
NAUSEA: 0
PALPITATIONS: 0

## 2021-11-12 ASSESSMENT — LIFESTYLE VARIABLES
ON A TYPICAL DAY WHEN YOU DRINK ALCOHOL HOW MANY DRINKS DO YOU HAVE: 0
EVER HAD A DRINK FIRST THING IN THE MORNING TO STEADY YOUR NERVES TO GET RID OF A HANGOVER: NO
CONSUMPTION TOTAL: NEGATIVE
EVER FELT BAD OR GUILTY ABOUT YOUR DRINKING: NO
TOTAL SCORE: 0
ALCOHOL_USE: NO
TOTAL SCORE: 0
HAVE YOU EVER FELT YOU SHOULD CUT DOWN ON YOUR DRINKING: NO
TOTAL SCORE: 0
HAVE PEOPLE ANNOYED YOU BY CRITICIZING YOUR DRINKING: NO
HOW MANY TIMES IN THE PAST YEAR HAVE YOU HAD 5 OR MORE DRINKS IN A DAY: 0
AVERAGE NUMBER OF DAYS PER WEEK YOU HAVE A DRINK CONTAINING ALCOHOL: 0

## 2021-11-12 ASSESSMENT — PATIENT HEALTH QUESTIONNAIRE - PHQ9
SUM OF ALL RESPONSES TO PHQ9 QUESTIONS 1 AND 2: 0
1. LITTLE INTEREST OR PLEASURE IN DOING THINGS: NOT AT ALL
2. FEELING DOWN, DEPRESSED, IRRITABLE, OR HOPELESS: NOT AT ALL

## 2021-11-12 ASSESSMENT — PAIN DESCRIPTION - PAIN TYPE: TYPE: ACUTE PAIN

## 2021-11-12 NOTE — PROGRESS NOTES
Cardiology Follow Up Progress Note    Date of Service  11/12/2021    Attending Physician  Deshaun Freire M.D.      Cardiology consultation for elevated troponins suspect myocarditis post covid vaccination    Presented with epigastric chest pain, shoulder pain, myalgia  x1 day after receiving first dose of Moderna covid vaccine on 11/9    PMH: No prior cardiac history, no family history of heart disease, Covid infection 9/2021, received Moderna covid vaccination  11/9/2021, BMI 46    Interim Events    No overnight cardiac events  Telemetry-SR  Waiting for 1 more troponin  Echo reassuring  No acute changes on EKG  Epigastric chest pain persists but improved  UDS negative  Labs reviewed  CBC and CMP unremarkable, aside from glucose 138  Trop peaked at 834    /90    Review of Systems  Review of Systems   Respiratory: Negative for apnea, cough, choking, chest tightness, shortness of breath, wheezing and stridor.    Cardiovascular: Positive for chest pain. Negative for palpitations and leg swelling.   Musculoskeletal: Positive for back pain.       Vital signs in last 24 hours  Temp:  [36.3 °C (97.4 °F)-36.9 °C (98.4 °F)] 36.7 °C (98.1 °F)  Pulse:  [] 84  Resp:  [16-19] 17  BP: (119-143)/(67-91) 137/83  SpO2:  [92 %-99 %] 92 %    Physical Exam  Physical Exam  Cardiovascular:      Rate and Rhythm: Normal rate and regular rhythm.   Pulmonary:      Effort: Pulmonary effort is normal.   Skin:     General: Skin is warm.   Neurological:      Mental Status: He is alert. Mental status is at baseline.   Psychiatric:         Mood and Affect: Mood normal.         Lab Review  Lab Results   Component Value Date/Time    WBC 8.5 11/11/2021 10:46 AM    RBC 5.15 11/11/2021 10:46 AM    HEMOGLOBIN 15.4 11/11/2021 10:46 AM    HEMATOCRIT 44.9 11/11/2021 10:46 AM    MCV 87.2 11/11/2021 10:46 AM    MCH 29.9 11/11/2021 10:46 AM    MCHC 34.3 11/11/2021 10:46 AM    MPV 10.9 11/11/2021 10:46 AM      Lab Results   Component Value  Date/Time    SODIUM 141 11/11/2021 10:46 AM    POTASSIUM 3.9 11/11/2021 10:46 AM    CHLORIDE 102 11/11/2021 10:46 AM    CO2 26 11/11/2021 10:46 AM    GLUCOSE 138 (H) 11/11/2021 10:46 AM    BUN 10 11/11/2021 10:46 AM    CREATININE 0.64 11/11/2021 10:46 AM      Lab Results   Component Value Date/Time    ASTSGOT 44 11/11/2021 10:46 AM    ALTSGPT 44 11/11/2021 10:46 AM     Lab Results   Component Value Date/Time    TROPONINT 833 (H) 11/12/2021 01:25 AM       Recent Labs     11/11/21  1046   NTPROBNP 204*       Cardiac Imaging and Procedures Review  EKG: SR    Echocardiogram:    11/11/2021  LVEF 55%  No regional wall motion abnormalities    Cardiac Catheterization: Not applicable    Imaging  Chest X-Ray: No acute cardiopulmonary abnormality.    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  -Echocardiogram reassuring, LVEF 55% no regional wall motion abnormalities  -EKG with no acute changes    Follow-up on troponin.  No further cardiac work-up.    Thank you for allowing me to participate in the care of this patient.      Please contact me with any questions.    SUZAN Pagan.   Cardiologist, [facility]  [phone]

## 2021-11-12 NOTE — PROGRESS NOTES
Report received from JOE Hayes. Patient is alert and oriented X 4. Pain in his chest is a 3.5/10. Tele-monitoring is in place. No further questions at this time.

## 2021-11-12 NOTE — CARE PLAN
The patient is Watcher - Medium risk of patient condition declining or worsening           Problem: Knowledge Deficit - Standard  Goal: Patient and family/care givers will demonstrate understanding of plan of care, disease process/condition, diagnostic tests and medications  Outcome: Progressing     Problem: Pain - Standard  Goal: Alleviation of pain or a reduction in pain to the patient’s comfort goal  Outcome: Progressing

## 2021-11-12 NOTE — PROGRESS NOTES
Received pt from ED. Patient A&O x 4. VS'S. RA. NSR on telemetry monitor. No complaints of pain at this time. POC discussed with patient. Pt verbalizes understanding. Call light and belongings with in reach. Bed locked and in lowest position, alarm and fall precautions in place.

## 2021-11-12 NOTE — DIETARY
NUTRITION SERVICES: BMI - Pt with BMI >40 (=Body mass index is 45.82 kg/m².), Class III obesity. Weight loss counseling not appropriate in acute care setting.   RECOMMEND - If appropriate at DC please refer to outpatient  services for weight management.

## 2021-11-13 VITALS
BODY MASS INDEX: 45.92 KG/M2 | WEIGHT: 292.55 LBS | TEMPERATURE: 97.5 F | SYSTOLIC BLOOD PRESSURE: 132 MMHG | HEART RATE: 100 BPM | DIASTOLIC BLOOD PRESSURE: 86 MMHG | HEIGHT: 67 IN | OXYGEN SATURATION: 98 % | RESPIRATION RATE: 16 BRPM

## 2021-11-13 LAB
ERYTHROCYTE [DISTWIDTH] IN BLOOD BY AUTOMATED COUNT: 43.3 FL (ref 35.9–50)
HCT VFR BLD AUTO: 46 % (ref 42–52)
HGB BLD-MCNC: 15.3 G/DL (ref 14–18)
MCH RBC QN AUTO: 30.2 PG (ref 27–33)
MCHC RBC AUTO-ENTMCNC: 33.3 G/DL (ref 33.7–35.3)
MCV RBC AUTO: 90.7 FL (ref 81.4–97.8)
PLATELET # BLD AUTO: 227 K/UL (ref 164–446)
PMV BLD AUTO: 10.7 FL (ref 9–12.9)
RBC # BLD AUTO: 5.07 M/UL (ref 4.7–6.1)
TROPONIN T SERPL-MCNC: 1430 NG/L (ref 6–19)
TROPONIN T SERPL-MCNC: 1534 NG/L (ref 6–19)
TROPONIN T SERPL-MCNC: 1643 NG/L (ref 6–19)
WBC # BLD AUTO: 8.9 K/UL (ref 4.8–10.8)

## 2021-11-13 PROCEDURE — 99217 PR OBSERVATION CARE DISCHARGE: CPT | Mod: GC | Performed by: HOSPITALIST

## 2021-11-13 PROCEDURE — 99214 OFFICE O/P EST MOD 30 MIN: CPT | Performed by: INTERNAL MEDICINE

## 2021-11-13 PROCEDURE — 85027 COMPLETE CBC AUTOMATED: CPT

## 2021-11-13 PROCEDURE — G0378 HOSPITAL OBSERVATION PER HR: HCPCS

## 2021-11-13 PROCEDURE — 36415 COLL VENOUS BLD VENIPUNCTURE: CPT

## 2021-11-13 PROCEDURE — 96372 THER/PROPH/DIAG INJ SC/IM: CPT

## 2021-11-13 PROCEDURE — 84484 ASSAY OF TROPONIN QUANT: CPT

## 2021-11-13 PROCEDURE — A9270 NON-COVERED ITEM OR SERVICE: HCPCS | Performed by: STUDENT IN AN ORGANIZED HEALTH CARE EDUCATION/TRAINING PROGRAM

## 2021-11-13 PROCEDURE — 700102 HCHG RX REV CODE 250 W/ 637 OVERRIDE(OP): Performed by: STUDENT IN AN ORGANIZED HEALTH CARE EDUCATION/TRAINING PROGRAM

## 2021-11-13 PROCEDURE — 700111 HCHG RX REV CODE 636 W/ 250 OVERRIDE (IP): Performed by: STUDENT IN AN ORGANIZED HEALTH CARE EDUCATION/TRAINING PROGRAM

## 2021-11-13 RX ADMIN — ENOXAPARIN SODIUM 40 MG: 40 INJECTION SUBCUTANEOUS at 06:24

## 2021-11-13 RX ADMIN — ACETAMINOPHEN 650 MG: 325 TABLET, FILM COATED ORAL at 06:24

## 2021-11-13 ASSESSMENT — ENCOUNTER SYMPTOMS
HEARTBURN: 0
DIZZINESS: 0
BLOOD IN STOOL: 0
SORE THROAT: 0
WEAKNESS: 0
ABDOMINAL PAIN: 0
MYALGIAS: 0
SINUS PAIN: 0
WHEEZING: 0
COUGH: 0
APNEA: 0
DIARRHEA: 0
SENSORY CHANGE: 0
HEMOPTYSIS: 0
BACK PAIN: 0
NAUSEA: 0
VOMITING: 0
NECK PAIN: 0
BLURRED VISION: 0
NERVOUS/ANXIOUS: 0
SEIZURES: 0
PALPITATIONS: 0
STRIDOR: 0
CONSTIPATION: 0
CHILLS: 0
DEPRESSION: 0
CLAUDICATION: 0
CHOKING: 0
ORTHOPNEA: 0
FEVER: 0
WEIGHT LOSS: 0
EYE PAIN: 0
FOCAL WEAKNESS: 0
HEADACHES: 0
CHEST TIGHTNESS: 0
SHORTNESS OF BREATH: 0

## 2021-11-13 ASSESSMENT — PAIN DESCRIPTION - PAIN TYPE
TYPE: ACUTE PAIN
TYPE: ACUTE PAIN

## 2021-11-13 NOTE — PROGRESS NOTES
"Daily Progress Note:     Date of Service: 11/12/2021  Primary Team: [facility] Team   Attending: Deshaun Freire M.D.   Senior Resident: Dr. Castellanos  Intern: Dr. Walter  Contact:  [phone]    Chief Complaint:   Chest pain x3 days    Subjective:  Interval History:  No acute events overnight, no concerns as per night team or nursing.. Patient currently denying chest pain, on tylenol patient denying any chest pain SOB, GERD, SOB overnight, has been up and walking around room without need for oxygen. Angelito Rodriguez is a 26 y.o. male with past medical history of COVID in 9/2021, who presented 11/11/2021 with chest pain after receiving 1st dose of Moderna COVID vaccine on 11/9 found to have elevated troponins without EKG changes concerning for STEMI.     Patient \"feeling pretty good, my chest pain is gone, and I do not have any fever or weakness.\" Patient denies any fever, weakness, nausea/vomiting/diarrhea/constipation, shortness of breath, or cough. Patient concerned about when he can return to work, patient able to reiterate care up to this point. Patient agreeable for continued hospitalization monitored for likely Covid vaccine induced  myocarditis, cardiology on board. Discussed concerns for second shot of Moderna vaccine.    Consultants/Specialty:  Cardiology  Review of Systems:    Review of Systems   Constitutional: Negative for chills, fever, malaise/fatigue and weight loss.   HENT: Negative for ear discharge, hearing loss, sinus pain and sore throat.    Eyes: Negative for blurred vision and pain.   Respiratory: Negative for cough, hemoptysis, shortness of breath and wheezing.    Cardiovascular: Negative for chest pain, palpitations, orthopnea, claudication and leg swelling.   Gastrointestinal: Negative for abdominal pain, blood in stool, constipation, diarrhea, heartburn, melena, nausea and vomiting.   Genitourinary: Negative for dysuria, frequency and hematuria.   Musculoskeletal: Negative for back " pain, joint pain, myalgias and neck pain.   Skin: Negative for itching and rash.   Neurological: Negative for dizziness, sensory change, focal weakness, seizures, weakness and headaches.   Psychiatric/Behavioral: Negative for depression. The patient is not nervous/anxious.        Objective Data:   Physical Exam:   Vitals:   Temp:  [36.1 °C (97 °F)-36.9 °C (98.4 °F)] 36.2 °C (97.2 °F)  Pulse:  [84-96] 90  Resp:  [17-18] 18  BP: (119-144)/(75-92) 127/75  SpO2:  [92 %-99 %] 97 %     Physical Exam  Constitutional:       General: He is not in acute distress.     Appearance: Normal appearance. He is not ill-appearing.   HENT:      Head: Normocephalic and atraumatic.      Right Ear: External ear normal.      Left Ear: External ear normal.      Nose: Nose normal. No congestion or rhinorrhea.      Mouth/Throat:      Mouth: Mucous membranes are moist.      Pharynx: Oropharynx is clear. No oropharyngeal exudate or posterior oropharyngeal erythema.   Eyes:      General: No scleral icterus.     Extraocular Movements: Extraocular movements intact.      Conjunctiva/sclera: Conjunctivae normal.      Pupils: Pupils are equal, round, and reactive to light.   Cardiovascular:      Rate and Rhythm: Normal rate and regular rhythm.      Pulses: Normal pulses.      Heart sounds: Normal heart sounds. No murmur heard.  No friction rub. No gallop.    Pulmonary:      Effort: Pulmonary effort is normal. No respiratory distress.      Breath sounds: Normal breath sounds. No rales.   Abdominal:      General: Abdomen is flat. Bowel sounds are normal. There is no distension.      Palpations: Abdomen is soft. There is no mass.      Tenderness: There is no abdominal tenderness.   Musculoskeletal:         General: No swelling or tenderness.      Cervical back: Normal range of motion and neck supple. No rigidity.      Right lower leg: No edema.      Left lower leg: No edema.   Lymphadenopathy:      Cervical: No cervical adenopathy.   Skin:     General:  Skin is warm.      Capillary Refill: Capillary refill takes less than 2 seconds.      Coloration: Skin is not jaundiced.      Findings: No bruising or lesion.   Neurological:      General: No focal deficit present.      Mental Status: He is alert and oriented to person, place, and time. Mental status is at baseline.      Cranial Nerves: No cranial nerve deficit.      Motor: No weakness.      Gait: Gait normal.           Labs:   Troponin  833  Imaging:   EC-ECHOCARDIOGRAM COMPLETE W/O CONT   Final Result      DX-CHEST-PORTABLE (1 VIEW)   Final Result      No acute cardiopulmonary abnormality.         Echo   No prior study is available for comparison.   Normal left ventricular chamber size.  The left ventricular ejection fraction is visually estimated to be 55%.  Normal diastolic function.    Problem Representation: .   Angelito Rodriguez is a 26 y.o. male with past medical history of COVID in 9/2021, who presented 11/11/2021 with chest pain after receiving 1st dose of Moderna COVID vaccine on 11/9 found to have elevated troponins without EKG changes concerning for STEMI.     Pain in the chest  Assessment & Plan  Post 1st dose of Moderna vaccine. Trop 666, to 800s x2 EKG NSR x2, no acute ST-T changes. Stat echo with EF 55% and no other acute abnormalities or pericardial effusions. D-Dimer and UDS wnls. No new exposures or medications. Likely covid -vaccine myocarditis, vs post infectious vs GERD unlikely ACS or PE at this time.   Supportive pain control with Tylenol, avoid to use NSAID or steroid or colchicine per cardiology  Cardiology Dr. Muñiz is consulted: appreciate recommendations   -continue to trend troponins. continue tele to monitor for arhythmias   -Can consider treating with NSAIDs or colchicine if pain returns, per literature review most cases are mild and self-resolving with troponins peaking at 3-5 days.   -Will have low suspicion for CAD vs worsening effusion vs PE if patient becomes  hemodynamically unstable or pain or sob returns.       History of COVID-19  Assessment & Plan  Hx of COVID infection in 9/2021    Class 3 severe obesity in adult (HCC)  Assessment & Plan  BMI 45  Life style modifications including healthy plant based diet and regular exercise recommended   DVT: lovenox  Code: Full code

## 2021-11-13 NOTE — PROGRESS NOTES
Cardiology Follow Up Progress Note    Date of Service  11/13/2021    Attending Physician  Deshaun Freire M.D.      Cardiology consultation for elevated troponins suspect myocarditis post covid vaccination    Presented with epigastric chest pain, shoulder pain, myalgia  x1 day after receiving first dose of Moderna covid vaccine on 11/9    PMH: No prior cardiac history, no family history of heart disease, Covid infection 9/2021, received Moderna covid vaccination  11/9/2021, BMI 46    Interim Events    No overnight cardiac events  Telemetry-SR  Labs reviewed  Trending troponin, uptrending 1643 NG/L, repeat troponin pending this morning  Denies any chest/back discomfort this morning      Review of Systems  Review of Systems   Respiratory: Negative for apnea, cough, choking, chest tightness, shortness of breath, wheezing and stridor.    Cardiovascular: Negative for chest pain, palpitations and leg swelling.   Musculoskeletal: Negative for back pain.       Vital signs in last 24 hours  Temp:  [36.1 °C (97 °F)-36.6 °C (97.9 °F)] 36.4 °C (97.6 °F)  Pulse:  [87-96] 91  Resp:  [16-18] 16  BP: (116-149)/(75-92) 149/88  SpO2:  [95 %-98 %] 96 %    Physical Exam  Physical Exam  Cardiovascular:      Rate and Rhythm: Normal rate and regular rhythm.   Pulmonary:      Effort: Pulmonary effort is normal.   Skin:     General: Skin is warm.   Neurological:      Mental Status: He is alert. Mental status is at baseline.   Psychiatric:         Mood and Affect: Mood normal.         Lab Review  Lab Results   Component Value Date/Time    WBC 8.9 11/13/2021 01:57 AM    RBC 5.07 11/13/2021 01:57 AM    HEMOGLOBIN 15.3 11/13/2021 01:57 AM    HEMATOCRIT 46.0 11/13/2021 01:57 AM    MCV 90.7 11/13/2021 01:57 AM    MCH 30.2 11/13/2021 01:57 AM    MCHC 33.3 (L) 11/13/2021 01:57 AM    MPV 10.7 11/13/2021 01:57 AM      Lab Results   Component Value Date/Time    SODIUM 141 11/11/2021 10:46 AM    POTASSIUM 3.9 11/11/2021 10:46 AM    CHLORIDE 102  11/11/2021 10:46 AM    CO2 26 11/11/2021 10:46 AM    GLUCOSE 138 (H) 11/11/2021 10:46 AM    BUN 10 11/11/2021 10:46 AM    CREATININE 0.64 11/11/2021 10:46 AM      Lab Results   Component Value Date/Time    ASTSGOT 44 11/11/2021 10:46 AM    ALTSGPT 44 11/11/2021 10:46 AM     Lab Results   Component Value Date/Time    TROPONINT 1643 (H) 11/13/2021 01:57 AM       Recent Labs     11/11/21  1046   NTPROBNP 204*       Cardiac Imaging and Procedures Review  EKG: SR    Echocardiogram:    11/11/2021  LVEF 55%  No regional wall motion abnormalities    Cardiac Catheterization: Not applicable    Imaging  Chest X-Ray: No acute cardiopulmonary abnormality.    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  No further cardiac work-up.  Will follow along    Thank you for allowing me to participate in the care of this patient.      Please contact me with any questions.    SUZAN Pagan.   Cardiologist, [facility]  [phone]    Patient was seen independently and examined.  I have discussed and agree with the plan and findings of Ms Rouse except as noted below.    Had any further chest pains orthostasis symptoms unfortunately troponin continues to increase    Telemetry no events    Myocarditis presumably from Covid vaccination  Continue to trend troponin would like to see if stabilized prior to discharge and will follow up as an outpatient      It is my pleasure to participate in the care of Mr. Jericho Rodriguez.  Please do not hesitate to contact me with questions or concerns.    Manpreet Muñiz MD PhD FACC  Cardiologist [facility]    Please note that this dictation was created using voice recognition software. There may be errors I did not discover before finalizing the note.

## 2021-11-13 NOTE — PROGRESS NOTES
Assumed care at 0700. Bedside report received from Freddy. Patient's chart and MAR reviewed. Pt denies pain at this time. Pt is A & O 4. Patient was updated on plan of care for the day. Questions answered and concerns addressed.  Pt denies any additional needs at this time. White board updated. Call light, phone and personal belongings within reach.

## 2021-11-13 NOTE — CARE PLAN
The patient is Stable - Low risk of patient condition declining or worsening    Shift Goals  Clinical Goals: Trops downtrend  Patient Goals: Rest      Problem: Knowledge Deficit - Standard  Goal: Patient and family/care givers will demonstrate understanding of plan of care, disease process/condition, diagnostic tests and medications  Outcome: Progressing  Note: Discussed POC and medications with patient.  Patient verbalized understanding.

## 2021-11-13 NOTE — CARE PLAN
The patient is Stable - Low risk of patient condition declining or worsening    Shift Goals  Clinical Goals: Mild activity, decrease trops, pain control  Patient Goals: Safe for DC    Progress made toward(s) clinical / shift goals:  Troponins decreasing, patient denies pain, pt informed is ok to walk the floor (w/ mask) for mobility.     Patient is not progressing towards the following goals:

## 2021-11-13 NOTE — PROGRESS NOTES
Daily Progress Note:     Date of Service: 11/13/2021  Primary Team: [facility] Team   Attending: Deshaun Freire M.D.   Senior Resident: Dr. Castellanos  Intern: Dr. Walter  Contact:  [phone]    ID:  26 y.o. male with past medical history of COVID in 9/2021, who presented 11/11/2021 with chest pain after receiving 1st dose of Moderna COVID vaccine on 11/9 found to have elevated troponins without EKG changes concerning for STEMI.     Chief Complaint:   Chest pain x3 days    Subjective/interval changes  No acute events overnight,   no concerns as per night team or nursing.  Denies chest pain, shortness of breath or any other associated symptoms  Feels good, and wants us to e-mail his HR supervisor about his hospital stay.      Consultants/Specialty:  Cardiology    Review of Systems:    Review of Systems   Constitutional: Negative for chills, fever, malaise/fatigue and weight loss.   HENT: Negative for ear discharge, hearing loss, sinus pain and sore throat.    Eyes: Negative for blurred vision and pain.   Respiratory: Negative for cough, hemoptysis, shortness of breath and wheezing.    Cardiovascular: Negative for chest pain, palpitations, orthopnea, claudication and leg swelling.   Gastrointestinal: Negative for abdominal pain, blood in stool, constipation, diarrhea, heartburn, melena, nausea and vomiting.   Genitourinary: Negative for dysuria, frequency and hematuria.   Musculoskeletal: Negative for back pain, joint pain, myalgias and neck pain.   Skin: Negative for itching and rash.   Neurological: Negative for dizziness, sensory change, focal weakness, seizures, weakness and headaches.   Psychiatric/Behavioral: Negative for depression. The patient is not nervous/anxious.        Objective Data:   Physical Exam:   Vitals:   Temp:  [36.1 °C (97 °F)-36.6 °C (97.9 °F)] 36.4 °C (97.6 °F)  Pulse:  [87-96] 91  Resp:  [16-18] 16  BP: (116-149)/(75-92) 149/88  SpO2:  [95 %-98 %] 96 %     Physical Exam  Constitutional:        General: He is not in acute distress.     Appearance: Normal appearance. He is not ill-appearing.   HENT:      Head: Normocephalic and atraumatic.      Right Ear: External ear normal.      Left Ear: External ear normal.      Nose: Nose normal. No congestion or rhinorrhea.      Mouth/Throat:      Mouth: Mucous membranes are moist.      Pharynx: Oropharynx is clear. No oropharyngeal exudate or posterior oropharyngeal erythema.   Eyes:      General: No scleral icterus.     Extraocular Movements: Extraocular movements intact.      Conjunctiva/sclera: Conjunctivae normal.      Pupils: Pupils are equal, round, and reactive to light.   Cardiovascular:      Rate and Rhythm: Normal rate and regular rhythm.      Pulses: Normal pulses.      Heart sounds: Normal heart sounds. No murmur heard.  No friction rub. No gallop.    Pulmonary:      Effort: Pulmonary effort is normal. No respiratory distress.      Breath sounds: Normal breath sounds. No rales.   Abdominal:      General: Abdomen is flat. Bowel sounds are normal. There is no distension.      Palpations: Abdomen is soft. There is no mass.      Tenderness: There is no abdominal tenderness.   Musculoskeletal:         General: No swelling or tenderness.      Cervical back: Normal range of motion and neck supple. No rigidity.      Right lower leg: No edema.      Left lower leg: No edema.   Lymphadenopathy:      Cervical: No cervical adenopathy.   Skin:     General: Skin is warm.      Capillary Refill: Capillary refill takes less than 2 seconds.      Coloration: Skin is not jaundiced.      Findings: No bruising or lesion.   Neurological:      General: No focal deficit present.      Mental Status: He is alert and oriented to person, place, and time. Mental status is at baseline.      Cranial Nerves: No cranial nerve deficit.      Motor: No weakness.      Gait: Gait normal.           Labs:   Troponin is trending up, most recent troponin in 1600s    Imaging:   EC-ECHOCARDIOGRAM  COMPLETE W/O CONT   Final Result      DX-CHEST-PORTABLE (1 VIEW)   Final Result      No acute cardiopulmonary abnormality.         Echo   No prior study is available for comparison.   Normal left ventricular chamber size.  The left ventricular ejection fraction is visually estimated to be 55%.  Normal diastolic function.    Problem Representation: .   Angelito Rodriguez is a 26 y.o. male with past medical history of COVID in 9/2021, who presented 11/11/2021 with chest pain after receiving 1st dose of Moderna COVID vaccine on 11/9 found to have elevated troponins without EKG changes concerning for STEMI.     * Myocarditis  Assessment & Plan  Post 1st dose of Moderna vaccine.   Coming with chest pain elevated troponin  no acute ST-T changes.   echo with EF 55% and no other acute abnormalities or pericardial effusions.   D-Dimer and UDS wnls.  No new exposures or medications.   Likely covid -vaccine myocarditis, vs post infectious  Supportive pain control with Tylenol, avoid to use NSAID or steroid or colchicine per cardiology  Troponin is trending up, most recent troponin and 1600s  -continue to trend troponins. continue tele to monitor for arhythmias.    History of COVID-19  Assessment & Plan  Hx of COVID infection in 9/2021    Class 3 severe obesity in adult (HCC)  Assessment & Plan  BMI 45  Life style modifications including healthy plant based diet and regular exercise recommended   DVT: lovenox  Code: Full code

## 2021-11-13 NOTE — PROGRESS NOTES
Lab called with critical result of troponin 1534 at 1210. Critical lab result read back to Lab.   Dr. Castellanos notified of critical lab result at 1216.  Critical lab result read back by Dr. Castellanos.

## 2021-11-13 NOTE — PROGRESS NOTES
Patient is an otherwise healthy male who presents with chest pain 3 days after having Moderna covid vaccine found to have troponin up to 800s, with EKG negative for ischemic changes and echo with normal LV wall motion. Presentation concerning for myocarditis. Plan to treat conservatively. Is currently asymptomatic.     Deshaun Freire MD

## 2021-11-14 NOTE — DISCHARGE INSTRUCTIONS
Discharge Instructions    Discharged to home by car with relative. Discharged via walking, hospital escort: Refused.  Special equipment needed: Not Applicable    Be sure to schedule a follow-up appointment with your primary care doctor or any specialists as instructed.     Discharge Plan:   Diet Plan: Discussed  Activity Level: Discussed  Confirmed Follow up Appointment: Appointment Scheduled  Confirmed Symptoms Management: Discussed  Medication Reconciliation Updated: Yes  Influenza Vaccine Indication: Patient Refuses    I understand that a diet low in cholesterol, fat, and sodium is recommended for good health. Unless I have been given specific instructions below for another diet, I accept this instruction as my diet prescription.   Other diet:   Heart-Healthy Eating Plan  Heart-healthy meal planning includes:  · Eating less unhealthy fats.  · Eating more healthy fats.  · Making other changes in your diet.  Talk with your doctor or a diet specialist (dietitian) to create an eating plan that is right for you.  What is my plan?  Your doctor may recommend an eating plan that includes:  · Total fat: ______% or less of total calories a day.  · Saturated fat: ______% or less of total calories a day.  · Cholesterol: less than _________mg a day.  What are tips for following this plan?  Cooking  Avoid frying your food. Try to bake, boil, grill, or broil it instead. You can also reduce fat by:  · Removing the skin from poultry.  · Removing all visible fats from meats.  · Steaming vegetables in water or broth.  Meal planning    · At meals, divide your plate into four equal parts:  ? Fill one-half of your plate with vegetables and green salads.  ? Fill one-fourth of your plate with whole grains.  ? Fill one-fourth of your plate with lean protein foods.  · Eat 4-5 servings of vegetables per day. A serving of vegetables is:  ? 1 cup of raw or cooked vegetables.  ? 2 cups of raw leafy greens.  · Eat 4-5 servings of fruit per  day. A serving of fruit is:  ? 1 medium whole fruit.  ? ¼ cup of dried fruit.  ? ½ cup of fresh, frozen, or canned fruit.  ? ½ cup of 100% fruit juice.  · Eat more foods that have soluble fiber. These are apples, broccoli, carrots, beans, peas, and barley. Try to get 20-30 g of fiber per day.  · Eat 4-5 servings of nuts, legumes, and seeds per week:  ? 1 serving of dried beans or legumes equals ½ cup after being cooked.  ? 1 serving of nuts is ¼ cup.  ? 1 serving of seeds equals 1 tablespoon.  General information  · Eat more home-cooked food. Eat less restaurant, buffet, and fast food.  · Limit or avoid alcohol.  · Limit foods that are high in starch and sugar.  · Avoid fried foods.  · Lose weight if you are overweight.  · Keep track of how much salt (sodium) you eat. This is important if you have high blood pressure. Ask your doctor to tell you more about this.  · Try to add vegetarian meals each week.  Fats  · Choose healthy fats. These include olive oil and canola oil, flaxseeds, walnuts, almonds, and seeds.  · Eat more omega-3 fats. These include salmon, mackerel, sardines, tuna, flaxseed oil, and ground flaxseeds. Try to eat fish at least 2 times each week.  · Check food labels. Avoid foods with trans fats or high amounts of saturated fat.  · Limit saturated fats.  ? These are often found in animal products, such as meats, butter, and cream.  ? These are also found in plant foods, such as palm oil, palm kernel oil, and coconut oil.  · Avoid foods with partially hydrogenated oils in them. These have trans fats. Examples are stick margarine, some tub margarines, cookies, crackers, and other baked goods.  What foods can I eat?  Fruits  All fresh, canned (in natural juice), or frozen fruits.  Vegetables  Fresh or frozen vegetables (raw, steamed, roasted, or grilled). Green salads.  Grains  Most grains. Choose whole wheat and whole grains most of the time. Rice and pasta, including brown rice and pastas made with  whole wheat.  Meats and other proteins  Lean, well-trimmed beef, veal, pork, and lamb. Chicken and turkey without skin. All fish and shellfish. Wild duck, rabbit, pheasant, and venison. Egg whites or low-cholesterol egg substitutes. Dried beans, peas, lentils, and tofu. Seeds and most nuts.  Dairy  Low-fat or nonfat cheeses, including ricotta and mozzarella. Skim or 1% milk that is liquid, powdered, or evaporated. Buttermilk that is made with low-fat milk. Nonfat or low-fat yogurt.  Fats and oils  Non-hydrogenated (trans-free) margarines. Vegetable oils, including soybean, sesame, sunflower, olive, peanut, safflower, corn, canola, and cottonseed. Salad dressings or mayonnaise made with a vegetable oil.  Beverages  Mineral water. Coffee and tea. Diet carbonated beverages.  Sweets and desserts  Sherbet, gelatin, and fruit ice. Small amounts of dark chocolate.  Limit all sweets and desserts.  Seasonings and condiments  All seasonings and condiments.  The items listed above may not be a complete list of foods and drinks you can eat. Contact a dietitian for more options.  What foods should I avoid?  Fruits  Canned fruit in heavy syrup. Fruit in cream or butter sauce. Fried fruit. Limit coconut.  Vegetables  Vegetables cooked in cheese, cream, or butter sauce. Fried vegetables.  Grains  Breads that are made with saturated or trans fats, oils, or whole milk. Croissants. Sweet rolls. Donuts. High-fat crackers, such as cheese crackers.  Meats and other proteins  Fatty meats, such as hot dogs, ribs, sausage, noel, rib-eye roast or steak. High-fat deli meats, such as salami and bologna. Caviar. Domestic duck and goose. Organ meats, such as liver.  Dairy  Cream, sour cream, cream cheese, and creamed cottage cheese. Whole-milk cheeses. Whole or 2% milk that is liquid, evaporated, or condensed. Whole buttermilk. Cream sauce or high-fat cheese sauce. Yogurt that is made from whole milk.  Fats and oils  Meat fat, or shortening.  Cocoa butter, hydrogenated oils, palm oil, coconut oil, palm kernel oil. Solid fats and shortenings, including noel fat, salt pork, lard, and butter. Nondairy cream substitutes. Salad dressings with cheese or sour cream.  Beverages  Regular sodas and juice drinks with added sugar.  Sweets and desserts  Frosting. Pudding. Cookies. Cakes. Pies. Milk chocolate or white chocolate. Buttered syrups. Full-fat ice cream or ice cream drinks.  The items listed above may not be a complete list of foods and drinks to avoid. Contact a dietitian for more information.  Summary  · Heart-healthy meal planning includes eating less unhealthy fats, eating more healthy fats, and making other changes in your diet.  · Eat a balanced diet. This includes fruits and vegetables, low-fat or nonfat dairy, lean protein, nuts and legumes, whole grains, and heart-healthy oils and fats.  This information is not intended to replace advice given to you by your health care provider. Make sure you discuss any questions you have with your health care provider.  Document Released: 06/18/2013 Document Revised: 02/21/2019 Document Reviewed: 01/25/2019  [employer] Patient Education © 2020 [employer]      Special Instructions: None    · Is patient discharged on Warfarin / Coumadin?   No       Myocarditis, Adult    Myocarditis is inflammation of the heart muscle (myocardium). When heart muscle is inflamed, the heart cannot pump blood in an efficient way. As a result heart muscle may get damaged and swollen, and you may experience fast heart rhythms. Severe cases of myocarditis can cause heart failure.  What are the causes?  Common causes of this condition include:  · Viral infections.  · Bacterial infections.  · Parasitic infections.  · Fungal infections.  · Radiation.  · Reactions to medicines.  · Exposure to certain chemicals or toxins.  · Autoimmune diseases.  · Inflammatory conditions.  · Connective tissue diseases.  What are the signs or  symptoms?  Symptoms of this condition include:  · Chest pain.  · Shortness of breath.  · Fast or abnormal heart rhythms.  · Fatigue.  · Swelling in the feet or legs.  · Fever.  · Body aches.  · Fainting.  Mild cases of this condition may not cause symptoms.  How is this diagnosed?  This condition may be hard to diagnose because it can look like other conditions. It may be diagnosed based on:  · Your symptoms. The condition may be suspected if your symptoms appear after a recent infection.  · A physical exam.  · Tests, such as:  ? Blood tests to check for signs of infection or heart damage.  ? An electrocardiogram to show your heart's electrical patterns and rhythms.  ? A chest X-ray to look at your heart and lungs.  ? An echocardiogram or other imaging tests to look at how well your heart is working.  ? A test called cardiac catheterization to check for inflammation or infection.  How is this treated?  Treatment for this condition depends on the cause. It may include taking medicines, such as:  · Heart medicines like beta blockers or angiotensin-converting enzyme (ACE) inhibitors. These medicines help strengthen the heart and help it beat more regularly.  · Diuretic medicine. This medicine can help get rid of extra fluid in the body. Extra fluid can make the heart work harder.  · Steroid medicine. This medicine reduces swelling.  · Medicine for pain.  · Anti-inflammatory medicine. This medicine reduces inflammation in the heart.  · Antibiotic medicines. These may be prescribed if a bacterial infection caused the condition.  Follow these instructions at home:  Medicines  · Take over-the-counter and prescription medicines only as told by your health care provider.  · If you were prescribed an antibiotic medicine, take it as told by your health care provider. Do not stop taking the antibiotic even if you start to feel better.  General instructions  · Do not use any products that contain nicotine or tobacco, such as  cigarettes and e-cigarettes. If you need help quitting, ask your health care provider.  · Avoid or limit alcohol.  · Avoid people who are sick.  · Wash your hands with soap and water regularly to avoid getting an infection. If soap and water are not available, use hand .  · Return to your normal activities as told by your health care provider. Ask your health care provider what activities are safe for you.  · Keep all follow-up visits as told by your health care provider. This is important.  Contact a health care provider if:  · You have a fever.  · Your symptoms continue to get worse, and medicine does not help  · You have swelling in your legs, feet, or face.  · Your breathing is faster than normal.  · You have constipation or diarrhea.  Get help right away if:  · You have severe chest pain.  · You have shortness of breath.  · You have problems breathing.  · You become more pale than normal.  · Your hands or legs are cold and blue.  · You pass out or faint.  · You have fast or abnormal heart rhythms.  · You have nausea, vomiting, and abdominal pain.  These symptoms may represent a serious problem that is an emergency. Do not wait to see if the symptoms will go away. Get medical help right away. Call your local emergency services (911 in the [place] Do not drive yourself to the hospital.  Summary  · Myocarditis is inflammation of the heart muscle (myocardium).  · Mild cases of myocarditis may not cause symptoms.  · Myocarditis may be hard to diagnose because it can look like other conditions.  · Treatment for myocarditis includes treating the underlying cause of the disease.  This information is not intended to replace advice given to you by your health care provider. Make sure you discuss any questions you have with your health care provider.  Document Released: 11/09/2006 Document Revised: 11/30/2018 Document Reviewed: 01/25/2018  [employer] Patient Education © 2020 [employer]      Depression / Suicide  Risk    As you are discharged from this [facility] facility, it is important to learn how to keep safe from harming yourself.    Recognize the warning signs:  · Abrupt changes in personality, positive or negative- including increase in energy   · Giving away possessions  · Change in eating patterns- significant weight changes-  positive or negative  · Change in sleeping patterns- unable to sleep or sleeping all the time   · Unwillingness or inability to communicate  · Depression  · Unusual sadness, discouragement and loneliness  · Talk of wanting to die  · Neglect of personal appearance   · Rebelliousness- reckless behavior  · Withdrawal from people/activities they love  · Confusion- inability to concentrate     If you or a loved one observes any of these behaviors or has concerns about self-harm, here's what you can do:  · Talk about it- your feelings and reasons for harming yourself  · Remove any means that you might use to hurt yourself (examples: pills, rope, extension cords, firearm)  · Get professional help from the community (Mental Health, Substance Abuse, psychological counseling)  · Do not be alone:Call your Safe Contact- someone whom you trust who will be there for you.  · Call your local CRISIS HOTLINE 905-5877 or 319-854-2046  · Call your local Children's Mobile Crisis Response Team [place] (874) 403-0745 or www.Fin Quiver  · Call the toll free National Suicide Prevention Hotlines   · National Suicide Prevention Lifeline 406-336-VPIU (0580)  · National Hope Line Network 800-SUICIDE (390-3494)

## 2021-11-14 NOTE — PROGRESS NOTES
Pt dc'd at 1810. IV and monitor removed; monitor room notified. Pt left unit via walking with relatives. Personal belongings with pt when leaving unit. Pt given discharge instructions prior to leaving unit including where to  prescriptions and when to follow-up; verbalizes understanding. Copy of discharge instructions with pt and in the chart.

## 2021-11-14 NOTE — PROGRESS NOTES
Lab called with critical result of troponin 1430 at 1650. Critical lab result read back to Lab.   Dr. Freire ([facility])notified of critical lab result at 1703.  Critical lab result read back by Dr. Castellanos ([facility]).

## 2021-11-14 NOTE — DISCHARGE SUMMARY
Discharge Summary    Date of Admission: 11/11/2021  Date of Discharge: 11/13/2021  6:10 PM  Discharging Attending: Dr. Freire  Discharging Senior Resident: Dr. Castellanos  Discharging Intern: Dr. Ortega    CHIEF COMPLAINT ON ADMISSION  Chief Complaint   Patient presents with   • Chest Pain     after receiving covid vaccine       Reason for Admission  Chest pain  Myocarditis.     Admission Date  11/11/2021    CODE STATUS  Prior    HPI & HOSPITAL COURSE  42-year-old male patient without significant past medical history who presented 11/11/2021 with chest pain after receiving first dose of Motrin covert vaccine on 11/9.  Upon presentation to the ER, the patient was hemodynamically stable however his labs was remarkable for troponin of 666, EKG was normal sinus rhythm without any acute ischemic changes.  Cardiology was consulted by ER physician and a stat echo was done which showed normal ejection fraction without any other abnormalities.  The patient was then admitted to the telemetry floor for further management and monitoring.  Troponin was trended trended up initially (666> 834> 833> 1085>1149>1218>1643) and peaked at 1643 then trended down to 1534>1430.  the patient was monitored on the telemetry without any rhythm abnormalities.  The patient remained hemodynamically stable and chest pain-free throughout the hospital stay.    Therefore, he is discharged in good and stable condition to home with close outpatient follow-up   With cardiology.  Repeat troponin in 1 week was also ordered on outpatient basis    The patient met 2-midnight criteria for an inpatient stay at the time of discharge.    PHYSICAL EXAM ON DISCHARGE  Temp:  [36.4 °C (97.5 °F)-36.6 °C (97.8 °F)] 36.4 °C (97.5 °F)  Pulse:  [] 100  Resp:  [16] 16  BP: (122-149)/(80-88) 132/86  SpO2:  [96 %-98 %] 98 %    Physical Exam  Constitutional:       General: He is not in acute distress.     Appearance: Normal appearance. He is obese. He is not  ill-appearing, toxic-appearing or diaphoretic.   HENT:      Head: Normocephalic.      Nose: Nose normal.   Eyes:      Pupils: Pupils are equal, round, and reactive to light.   Cardiovascular:      Rate and Rhythm: Normal rate and regular rhythm.      Pulses: Normal pulses.      Heart sounds: No murmur heard.  No friction rub. No gallop.    Pulmonary:      Effort: No respiratory distress.      Breath sounds: No stridor. No wheezing or rhonchi.   Abdominal:      General: There is no distension.      Tenderness: There is no abdominal tenderness. There is no guarding or rebound.      Hernia: No hernia is present.   Musculoskeletal:         General: No swelling, tenderness, deformity or signs of injury.   Neurological:      General: No focal deficit present.      Mental Status: He is alert and oriented to person, place, and time. Mental status is at baseline.      Cranial Nerves: No cranial nerve deficit.      Sensory: No sensory deficit.      Motor: No weakness.      Coordination: Coordination normal.   Psychiatric:         Mood and Affect: Mood normal.         Discharge Date  11/13/2021    FOLLOW UP ITEMS POST DISCHARGE  Follow-up with cardiology in 2 weeks    DISCHARGE DIAGNOSES  Principal Problem:    Myocarditis POA: Unknown  Active Problems:    Class 3 severe obesity in adult (HCC) POA: Unknown    History of COVID-19 POA: Unknown    Other acute myocarditis - Moderna Covid Vaccine POA: Yes  Resolved Problems:    * No resolved hospital problems. *      FOLLOW UP  Future Appointments   Date Time Provider Department Center   12/9/2021  3:15 PM [facility] None     Manpreet Muñiz M.D.  [address]  [address]  [facility] [id number]  [phone]    In 1 week  lab test in a week nonfasting at [facility]    Pcp Pt States None            MEDICATIONS ON DISCHARGE     Medication List      CONTINUE taking these medications      Instructions   acetaminophen 500 MG Tabs  Commonly known as: TYLENOL   Take 1,000  mg by mouth every 6 hours as needed for Mild Pain. 2 tablets = 1,000 mg.  Dose: 1,000 mg     albuterol 108 (90 Base) MCG/ACT Aers inhalation aerosol   Inhale 2 Puffs every 6 hours as needed for Shortness of Breath.  Dose: 2 Puff     VITAMIN C GUMMIES PO   Take 2 Tablets by mouth every evening.  Dose: 2 Tablet        STOP taking these medications    ibuprofen 200 MG Tabs  Commonly known as: MOTRIN            Allergies  No Known Allergies    DIET  No orders of the defined types were placed in this encounter.      ACTIVITY  no strenous acitivities for the next couple weeks    CONSULTATIONS  Dr. Muñiz with Cardiology consulted.  Treatment options were discussed and plan of care agreed upon.

## 2021-11-18 ENCOUNTER — HOSPITAL ENCOUNTER (OUTPATIENT)
Dept: LAB | Facility: MEDICAL CENTER | Age: 26
End: 2021-11-18
Attending: INTERNAL MEDICINE
Payer: COMMERCIAL

## 2021-11-18 DIAGNOSIS — I40.9 ACUTE MYOCARDITIS, UNSPECIFIED MYOCARDITIS TYPE: ICD-10-CM

## 2021-11-18 LAB — TROPONIN T SERPL-MCNC: 24 NG/L (ref 6–19)

## 2021-11-18 PROCEDURE — 84484 ASSAY OF TROPONIN QUANT: CPT

## 2021-11-18 PROCEDURE — 36415 COLL VENOUS BLD VENIPUNCTURE: CPT

## 2021-11-23 ENCOUNTER — PATIENT MESSAGE (OUTPATIENT)
Dept: CARDIOLOGY | Facility: MEDICAL CENTER | Age: 26
End: 2021-11-23

## 2021-11-23 NOTE — TELEPHONE ENCOUNTER
----- Message from Manpreet Muñiz M.D. sent at 11/18/2021  7:45 PM PST -----  This is back to normal which is excellent news.  Please let him know

## 2021-12-09 ENCOUNTER — OFFICE VISIT (OUTPATIENT)
Dept: CARDIOLOGY | Facility: MEDICAL CENTER | Age: 26
End: 2021-12-09
Payer: COMMERCIAL

## 2021-12-09 VITALS
RESPIRATION RATE: 17 BRPM | OXYGEN SATURATION: 97 % | DIASTOLIC BLOOD PRESSURE: 80 MMHG | HEART RATE: 74 BPM | SYSTOLIC BLOOD PRESSURE: 124 MMHG | BODY MASS INDEX: 45.36 KG/M2 | HEIGHT: 67 IN | WEIGHT: 289 LBS

## 2021-12-09 DIAGNOSIS — I40.8 OTHER ACUTE MYOCARDITIS: ICD-10-CM

## 2021-12-09 PROCEDURE — 99213 OFFICE O/P EST LOW 20 MIN: CPT | Performed by: NURSE PRACTITIONER

## 2021-12-09 RX ORDER — COVID-19 MOLECULAR TEST ASSAY
KIT MISCELLANEOUS
COMMUNITY
Start: 2021-09-28 | End: 2021-12-09

## 2021-12-09 ASSESSMENT — ENCOUNTER SYMPTOMS
CLAUDICATION: 0
PND: 0
FEVER: 0
ABDOMINAL PAIN: 0
DIZZINESS: 0
PALPITATIONS: 0
MYALGIAS: 0
ORTHOPNEA: 0
SHORTNESS OF BREATH: 0
COUGH: 0

## 2021-12-09 ASSESSMENT — FIBROSIS 4 INDEX: FIB4 SCORE: 0.7

## 2021-12-09 NOTE — PROGRESS NOTES
Chief Complaint   Patient presents with   • Myocarditis       Subjective     Angelito Rodriguez is a 26 y.o. male who presents today for follow-up on his myocarditis.    New patient of the office.  Patient presented to the ER on 11/11/2021 with chest pain.  Patient had recently received his Moderna Covid vaccine 3 days prior.  He also has had a history of Covid infection.  He was diagnosed with myocarditis, cardiology was consulted.  Cardiology recommended overnight monitoring.  Patient's chest pain did resolve.  Patient had increasing troponins and orthostasis.  Patient had outpatient troponin recheck.    Patient reports doing well.  He was able to go back to work.  He denies chest pain, palpitations, orthopnea, PND, edema, shortness of breath or dizziness/lightheadedness.    He is wondering if he can start exercising again.  He was previously weight lifting.    He denies any other medical history or problems.    Past Medical History:   Diagnosis Date   • Cold    • Other acute myocarditis - Moderna Covid Vaccine 11/11/2021   • Tonsillitis      Past Surgical History:   Procedure Laterality Date   • TONSILLECTOMY AND ADENOIDECTOMY  2/3/2011    [facility]   • EXAM UNDER ANESTHESIA  2/3/2011    Performed by NOELLE ABEBE at [facility]     Family History   Problem Relation Age of Onset   • Hypertension Other    • Stroke Other    • Cancer Other      Social History     Socioeconomic History   • Marital status: Single     Spouse name: Not on file   • Number of children: Not on file   • Years of education: Not on file   • Highest education level: Not on file   Occupational History   • Not on file   Tobacco Use   • Smoking status: Never Smoker   • Smokeless tobacco: Never Used   Vaping Use   • Vaping Use: Never used   Substance and Sexual Activity   • Alcohol use: No   • Drug use: No   • Sexual activity: Not on file   Other Topics Concern   • Not on  file   Social History Narrative   • Not on file     Social Determinants of Health     Financial Resource Strain:    • Difficulty of Paying Living Expenses: Not on file   Food Insecurity:    • Worried About Running Out of Food in the Last Year: Not on file   • Ran Out of Food in the Last Year: Not on file   Transportation Needs:    • Lack of Transportation (Medical): Not on file   • Lack of Transportation (Non-Medical): Not on file   Physical Activity:    • Days of Exercise per Week: Not on file   • Minutes of Exercise per Session: Not on file   Stress:    • Feeling of Stress : Not on file   Social Connections:    • Frequency of Communication with Friends and Family: Not on file   • Frequency of Social Gatherings with Friends and Family: Not on file   • Attends [employer] Services: Not on file   • Active Member of Clubs or Organizations: Not on file   • Attends Club or Organization Meetings: Not on file   • Marital Status: Not on file   Intimate Partner Violence:    • Fear of Current or Ex-Partner: Not on file   • Emotionally Abused: Not on file   • Physically Abused: Not on file   • Sexually Abused: Not on file   Housing Stability:    • Unable to Pay for Housing in the Last Year: Not on file   • Number of Places Lived in the Last Year: Not on file   • Unstable Housing in the Last Year: Not on file     No Known Allergies  Outpatient Encounter Medications as of 12/9/2021   Medication Sig Dispense Refill   • Ascorbic Acid (VITAMIN C GUMMIES PO) Take 2 Tablets by mouth every evening.     • acetaminophen (TYLENOL) 500 MG Tab Take 1,000 mg by mouth every 6 hours as needed for Mild Pain. 2 tablets = 1,000 mg.     • [DISCONTINUED] ID NOW COVID-19 Kit AS DIRECTED (Patient not taking: Reported on 12/9/2021)     • [DISCONTINUED] albuterol 108 (90 Base) MCG/ACT Aero Soln inhalation aerosol Inhale 2 Puffs every 6 hours as needed for Shortness of Breath.       No facility-administered encounter medications on file as of 12/9/2021.  "    Review of Systems   Constitutional: Negative for fever and malaise/fatigue.   Respiratory: Negative for cough and shortness of breath.    Cardiovascular: Negative for chest pain, palpitations, orthopnea, claudication, leg swelling and PND.   Gastrointestinal: Negative for abdominal pain.   Musculoskeletal: Negative for myalgias.   Neurological: Negative for dizziness.   All other systems reviewed and are negative.             Objective     /80 (BP Location: Left arm, Patient Position: Sitting, BP Cuff Size: Adult)   Pulse 74   Resp 17   Ht 1.702 m (5' 7\")   Wt (!) 131 kg (289 lb)   SpO2 97%   BMI 45.26 kg/m²     Physical Exam  Vitals reviewed.   Constitutional:       Appearance: He is well-developed.   HENT:      Head: Normocephalic and atraumatic.   Eyes:      Pupils: Pupils are equal, round, and reactive to light.   Neck:      Vascular: No JVD.   Cardiovascular:      Rate and Rhythm: Normal rate and regular rhythm.      Heart sounds: Normal heart sounds.   Pulmonary:      Effort: Pulmonary effort is normal. No respiratory distress.      Breath sounds: Normal breath sounds. No wheezing or rales.   Abdominal:      General: Bowel sounds are normal.      Palpations: Abdomen is soft.   Musculoskeletal:         General: Normal range of motion.      Cervical back: Normal range of motion and neck supple.      Right lower leg: No edema.      Left lower leg: No edema.   Skin:     General: Skin is warm and dry.   Neurological:      General: No focal deficit present.      Mental Status: He is alert and oriented to person, place, and time.   Psychiatric:         Behavior: Behavior normal.       No results found for: CHOLSTRLTOT, LDL, HDL, TRIGLYCERIDE    Lab Results   Component Value Date/Time    SODIUM 141 11/11/2021 10:46 AM    POTASSIUM 3.9 11/11/2021 10:46 AM    CHLORIDE 102 11/11/2021 10:46 AM    CO2 26 11/11/2021 10:46 AM    GLUCOSE 138 (H) 11/11/2021 10:46 AM    BUN 10 11/11/2021 10:46 AM    CREATININE " 0.64 11/11/2021 10:46 AM     Lab Results   Component Value Date/Time    ALKPHOSPHAT 66 11/11/2021 10:46 AM    ASTSGOT 44 11/11/2021 10:46 AM    ALTSGPT 44 11/11/2021 10:46 AM    TBILIRUBIN 0.4 11/11/2021 10:46 AM      Transthoracic Echo Report 11/11/2021  No prior study is available for comparison.   Normal left ventricular chamber size.  The left ventricular ejection fraction is visually estimated to be 55%.  Normal diastolic function.         Assessment & Plan     1. Other acute myocarditis - Moderna Covid Vaccine         Medical Decision Making: Today's Assessment/Status/Plan:        Myocarditis:  -Patient reports reports no current symptoms.  -Last troponin level back to normal  -Discussed with patient that there are no current recommendations for obtaining his second dose of Covid vaccine.  Discussed with patient that over time there might be further guidance and recommendations for patients with adverse reaction from the vaccine.  -Patient currently verbalizes understanding.  Letter given to patient for his job that second dose of vaccine is not advised at this time  -Patient can resume exercise, recommend starting slow with cardiovascular exercise such as walking before starting weightlifting.    FU in clinic in 3 months with Dr. Muñiz. Sooner if needed.    Patient verbalizes understanding and agrees with the plan of care.     PLEASE NOTE: This Note was created using voice recognition Software. I have made every reasonable attempt to correct obvious errors, but I expect that there are errors of grammar and possibly content that I did not discover before finalizing the note

## 2022-03-11 ENCOUNTER — OFFICE VISIT (OUTPATIENT)
Dept: CARDIOLOGY | Facility: MEDICAL CENTER | Age: 27
End: 2022-03-11
Payer: COMMERCIAL

## 2022-03-11 VITALS
WEIGHT: 293 LBS | RESPIRATION RATE: 16 BRPM | HEART RATE: 96 BPM | HEIGHT: 67 IN | BODY MASS INDEX: 45.99 KG/M2 | DIASTOLIC BLOOD PRESSURE: 82 MMHG | OXYGEN SATURATION: 97 % | SYSTOLIC BLOOD PRESSURE: 138 MMHG

## 2022-03-11 DIAGNOSIS — I40.8 OTHER ACUTE MYOCARDITIS: ICD-10-CM

## 2022-03-11 PROCEDURE — 99214 OFFICE O/P EST MOD 30 MIN: CPT | Performed by: INTERNAL MEDICINE

## 2022-03-11 ASSESSMENT — ENCOUNTER SYMPTOMS
EYE DISCHARGE: 0
SENSORY CHANGE: 0
HALLUCINATIONS: 0
BLURRED VISION: 0
LOSS OF CONSCIOUSNESS: 0
NAUSEA: 0
ABDOMINAL PAIN: 0
BRUISES/BLEEDS EASILY: 0
DOUBLE VISION: 0
HEADACHES: 0
PALPITATIONS: 0
ORTHOPNEA: 0
VOMITING: 0
CHILLS: 0
CLAUDICATION: 0
DEPRESSION: 0
BLOOD IN STOOL: 0
PND: 0
FEVER: 0
SHORTNESS OF BREATH: 0
EYE PAIN: 0
COUGH: 0
SPEECH CHANGE: 0
FALLS: 0
WEIGHT LOSS: 0
DIZZINESS: 0
MYALGIAS: 0

## 2022-03-11 ASSESSMENT — FIBROSIS 4 INDEX: FIB4 SCORE: 0.7

## 2022-03-11 NOTE — PROGRESS NOTES
Chief Complaint   Patient presents with   • Myocarditis       Subjective     Angelito Rodriguez is a 26 y.o. male who presents today for cardiac care and evaluation due to prior history of myocarditis secondary to moderate Covid vaccination.    In the interim, patient has been doing well without having any symptoms. Patient denies having chest pain, dyspnea, palpitation, presyncope, syncope episodes. Able to climb up at least 2 flights of stairs.      Past Medical History:   Diagnosis Date   • Cold    • Other acute myocarditis - Moderna Covid Vaccine 11/11/2021   • Tonsillitis      Past Surgical History:   Procedure Laterality Date   • TONSILLECTOMY AND ADENOIDECTOMY  2/3/2011    [facility]   • EXAM UNDER ANESTHESIA  2/3/2011    [facility]     Family History   Problem Relation Age of Onset   • Hypertension Other    • Stroke Other    • Cancer Other      Social History     Socioeconomic History   • Marital status: Single     Spouse name: Not on file   • Number of children: Not on file   • Years of education: Not on file   • Highest education level: Not on file   Occupational History   • Not on file   Tobacco Use   • Smoking status: Never Smoker   • Smokeless tobacco: Never Used   Vaping Use   • Vaping Use: Never used   Substance and Sexual Activity   • Alcohol use: No   • Drug use: No   • Sexual activity: Not on file   Other Topics Concern   • Not on file   Social History Narrative   • Not on file     Social Determinants of Health     Financial Resource Strain: Not on file   Food Insecurity: Not on file   Transportation Needs: Not on file   Physical Activity: Not on file   Stress: Not on file   Social Connections: Not on file   Intimate Partner Violence: Not on file   Housing Stability: Not on file     No Known Allergies  Outpatient Encounter Medications as of 3/11/2022   Medication Sig Dispense Refill   • Ascorbic  "Acid (VITAMIN C GUMMIES PO) Take 2 Tablets by mouth every evening.     • acetaminophen (TYLENOL) 500 MG Tab Take 1,000 mg by mouth every 6 hours as needed for Mild Pain. 2 tablets = 1,000 mg.       No facility-administered encounter medications on file as of 3/11/2022.     Review of Systems   Constitutional: Negative for chills, fever, malaise/fatigue and weight loss.   HENT: Negative for ear discharge, ear pain, hearing loss and nosebleeds.    Eyes: Negative for blurred vision, double vision, pain and discharge.   Respiratory: Negative for cough and shortness of breath.    Cardiovascular: Negative for chest pain, palpitations, orthopnea, claudication, leg swelling and PND.   Gastrointestinal: Negative for abdominal pain, blood in stool, melena, nausea and vomiting.   Genitourinary: Negative for dysuria and hematuria.   Musculoskeletal: Negative for falls, joint pain and myalgias.   Skin: Negative for itching and rash.   Neurological: Negative for dizziness, sensory change, speech change, loss of consciousness and headaches.   Endo/Heme/Allergies: Negative for environmental allergies. Does not bruise/bleed easily.   Psychiatric/Behavioral: Negative for depression, hallucinations and suicidal ideas.              Objective     /82 (BP Location: Left arm, Patient Position: Sitting, BP Cuff Size: Adult)   Pulse 96   Resp 16   Ht 1.702 m (5' 7\")   Wt (!) 133 kg (293 lb)   SpO2 97%   BMI 45.89 kg/m²     Physical Exam  Vitals and nursing note reviewed.   Constitutional:       General: He is not in acute distress.     Appearance: He is not diaphoretic.   HENT:      Head: Normocephalic and atraumatic.      Right Ear: External ear normal.      Left Ear: External ear normal.      Nose: No congestion or rhinorrhea.   Eyes:      General:         Right eye: No discharge.         Left eye: No discharge.   Neck:      Thyroid: No thyromegaly.      Vascular: No JVD.   Cardiovascular:      Rate and Rhythm: Normal rate and " regular rhythm.      Pulses: Normal pulses.   Pulmonary:      Effort: No respiratory distress.   Abdominal:      General: There is no distension.      Tenderness: There is no abdominal tenderness.   Musculoskeletal:         General: No swelling or tenderness.      Right lower leg: No edema.      Left lower leg: No edema.   Skin:     General: Skin is warm and dry.   Neurological:      Mental Status: He is alert and oriented to person, place, and time.      Cranial Nerves: No cranial nerve deficit.   Psychiatric:         Behavior: Behavior normal.                Assessment & Plan     1. Other acute myocarditis - Moderna Covid Vaccine         Medical Decision Making: Today's Assessment/Status/Plan:   At this time patient is clinically stable in terms of his cardiac standpoint.  Blood pressure is well controlled.  No further cardiac work up at this time.

## 2022-03-28 ENCOUNTER — OFFICE VISIT (OUTPATIENT)
Dept: URGENT CARE | Facility: CLINIC | Age: 27
End: 2022-03-28
Payer: COMMERCIAL

## 2022-03-28 ENCOUNTER — HOSPITAL ENCOUNTER (OUTPATIENT)
Facility: MEDICAL CENTER | Age: 27
End: 2022-03-28
Attending: PHYSICIAN ASSISTANT
Payer: COMMERCIAL

## 2022-03-28 VITALS
WEIGHT: 294 LBS | SYSTOLIC BLOOD PRESSURE: 138 MMHG | TEMPERATURE: 100.1 F | DIASTOLIC BLOOD PRESSURE: 90 MMHG | RESPIRATION RATE: 18 BRPM | OXYGEN SATURATION: 98 % | HEART RATE: 107 BPM | HEIGHT: 68 IN | BODY MASS INDEX: 44.56 KG/M2

## 2022-03-28 DIAGNOSIS — J11.1 INFLUENZA: ICD-10-CM

## 2022-03-28 DIAGNOSIS — R50.9 FEVER, UNSPECIFIED FEVER CAUSE: ICD-10-CM

## 2022-03-28 LAB
COVID ORDER STATUS COVID19: NORMAL
FLUAV+FLUBV AG SPEC QL IA: ABNORMAL
INT CON NEG: NEGATIVE
INT CON NEG: NEGATIVE
INT CON POS: POSITIVE
INT CON POS: POSITIVE
S PYO AG THROAT QL: NEGATIVE

## 2022-03-28 PROCEDURE — 87804 INFLUENZA ASSAY W/OPTIC: CPT | Performed by: PHYSICIAN ASSISTANT

## 2022-03-28 PROCEDURE — U0005 INFEC AGEN DETEC AMPLI PROBE: HCPCS

## 2022-03-28 PROCEDURE — U0003 INFECTIOUS AGENT DETECTION BY NUCLEIC ACID (DNA OR RNA); SEVERE ACUTE RESPIRATORY SYNDROME CORONAVIRUS 2 (SARS-COV-2) (CORONAVIRUS DISEASE [COVID-19]), AMPLIFIED PROBE TECHNIQUE, MAKING USE OF HIGH THROUGHPUT TECHNOLOGIES AS DESCRIBED BY CMS-2020-01-R: HCPCS

## 2022-03-28 PROCEDURE — 87880 STREP A ASSAY W/OPTIC: CPT | Performed by: PHYSICIAN ASSISTANT

## 2022-03-28 PROCEDURE — 99213 OFFICE O/P EST LOW 20 MIN: CPT | Performed by: PHYSICIAN ASSISTANT

## 2022-03-28 RX ORDER — OSELTAMIVIR PHOSPHATE 75 MG/1
75 CAPSULE ORAL 2 TIMES DAILY
Qty: 10 CAPSULE | Refills: 0 | Status: SHIPPED | OUTPATIENT
Start: 2022-03-28 | End: 2022-09-01

## 2022-03-28 ASSESSMENT — FIBROSIS 4 INDEX: FIB4 SCORE: 0.7

## 2022-03-28 NOTE — LETTER
March 28, 2022    To Whom It May Concern:         This is confirmation that Angelito Rodriguez attended his scheduled appointment with Vijaya Wooten P.A.-C. on 3/28/22. Please excuse him from work 3/28-3/30/22.         If you have any questions please do not hesitate to call me at the phone number listed below.    Sincerely,          Vijaya Wooten P.A.-C.  [phone]

## 2022-03-28 NOTE — PROGRESS NOTES
"Subjective:   Angelito Rodriguez is a 26 y.o. male who presents for Fever (Headache, body aches, stuffy nose, slight cough, sore throat x 1 day )         Patient presents with acute onset fever, headache, myalgias, nasal congestion which began yesterday evening.  No significant cough.  No sick contacts.  No abdominal pain, nausea, vomiting.  No chest pain or shortness of breath.  Interestingly he was hospitalized in November after receiving his Covid vaccine for myocarditis.  He is not complaining of any chest pain, shortness of breath or pain radiating into the arms or back.  Has been vaccinated against Covid.  Does note his heart rate has been elevated since his myocarditis.        Medications:  TYLENOL 8 HOUR PO  VITAMIN C GUMMIES PO    Allergies:             Patient has no known allergies.    Surgical History:         Past Surgical History:   Procedure Laterality Date   • TONSILLECTOMY AND ADENOIDECTOMY  2/3/2011    Performed by NOELLE ABEBE at [facility]   • EXAM UNDER ANESTHESIA  2/3/2011    Performed by NOELLE ABEBE at [facility]       Past Social Hx:  Angelito Rodriguze  reports that he has never smoked. He has never used smokeless tobacco. He reports that he does not drink alcohol and does not use drugs.     Past Family Hx:   Angelito Rodriguez family history includes Cancer in an other family member; Hypertension in an other family member; Stroke in an other family member.       Problem list, medications, and allergies reviewed by myself today in Epic.     Objective:     /90   Pulse (!) 107   Temp 37.8 °C (100.1 °F)   Resp 18   Ht 1.727 m (5' 8\")   Wt (!) 133 kg (294 lb)   SpO2 98%   BMI 44.70 kg/m²     Physical Exam  Vitals and nursing note reviewed.   Constitutional:       General: He is not in acute distress.     Appearance: Normal appearance.   HENT:      Head: Normocephalic.      Jaw: No trismus.      Right Ear: Tympanic membrane is " erythematous.      Left Ear: Tympanic membrane is erythematous.      Nose: Congestion and rhinorrhea present.      Mouth/Throat:      Mouth: Mucous membranes are moist.      Palate: No lesions.      Pharynx: Uvula midline. Posterior oropharyngeal erythema present. No oropharyngeal exudate.   Eyes:      Extraocular Movements: Extraocular movements intact.      Pupils: Pupils are equal, round, and reactive to light.   Cardiovascular:      Rate and Rhythm: Regular rhythm. Tachycardia present.      Pulses: Normal pulses.      Heart sounds: Normal heart sounds.   Pulmonary:      Effort: Pulmonary effort is normal. No accessory muscle usage or respiratory distress.      Breath sounds: Normal breath sounds. No stridor. No wheezing or rhonchi.   Abdominal:      General: Abdomen is flat.      Tenderness: There is no abdominal tenderness.   Lymphadenopathy:      Cervical: No cervical adenopathy.   Skin:     General: Skin is warm.      Findings: No rash.   Neurological:      General: No focal deficit present.      Mental Status: He is alert.   Psychiatric:         Behavior: Behavior is cooperative.       Rapid flu positive  Rapid strep negative  Assessment/Plan:     Diagnosis and Associated Orders:     1. Fever, unspecified fever cause  - SARS-CoV-2 PCR (24 hour In-House): Collect NP swab in [facility]; Future  - POCT Influenza A/B  - POCT Rapid Strep A  - oseltamivir (TAMIFLU) 75 MG Cap; Take 1 Capsule by mouth 2 times a day.  Dispense: 10 Capsule; Refill: 0  - Referral to establish with [facility] PCP    2. Influenza  - oseltamivir (TAMIFLU) 75 MG Cap; Take 1 Capsule by mouth 2 times a day.  Dispense: 10 Capsule; Refill: 0  - Referral to establish with [facility] PCP    Other orders  - Acetaminophen (TYLENOL 8 HOUR PO); Take  by mouth.        Comments/MDM:  Rapid flu positive.  Strep negative.  Covid PCR pending.  Prescription for oseltamivir sent to pharmacy.  Discussed usage.  Advised patient symptoms are viral in etiology, recommend  supportive care. Increased fluids and rest.  Recommend over-the-counter cold and flu medications and Tylenol and/or ibuprofen for symptomatic relief and fevers.  Discussed use of nedi-pot, humidifier, and Flonase nasal spray as well.  Discussed good hand hygiene and ways to decrease spread of disease.  Follow-up with PCP return for reevaluation if symptoms persist/worsen.  Patient offered discharge instructions regarding flu.  The patient demonstrated a good understanding and agreed with the treatment plan.     Patient to follow-up in ED if he does develop recurrent chest pain, shortness of breath, increased cough.    Covid PCR results will be available via "[employer].  Work note is provided.    I personally reviewed prior external notes and test results pertinent to today's visit.  Red flags discussed as well as indications to present to the Emergency Department.  Supportive care, natural history, differential diagnoses, and indications for immediate follow-up discussed.  Patient expresses understanding and agrees to plan.  Patient denies any other questions or concerns.    Follow-up with the primary care physician for recheck, reevaluation, and consideration of further management.      Please note that this dictation was created using voice recognition software. I have made a reasonable attempt to correct obvious errors, but I expect that there are errors of grammar and possibly content that I did not discover before finalizing the note.    This note was electronically signed by Vijaya Wooten PA-C

## 2022-03-29 LAB
SARS-COV-2 RNA RESP QL NAA+PROBE: NOTDETECTED
SPECIMEN SOURCE: NORMAL

## 2022-04-01 ENCOUNTER — OFFICE VISIT (OUTPATIENT)
Dept: MEDICAL GROUP | Facility: LAB | Age: 27
End: 2022-04-01
Payer: COMMERCIAL

## 2022-04-01 VITALS
DIASTOLIC BLOOD PRESSURE: 76 MMHG | RESPIRATION RATE: 14 BRPM | BODY MASS INDEX: 43.86 KG/M2 | HEIGHT: 68 IN | SYSTOLIC BLOOD PRESSURE: 128 MMHG | WEIGHT: 289.4 LBS | HEART RATE: 78 BPM | OXYGEN SATURATION: 97 % | TEMPERATURE: 97.9 F

## 2022-04-01 DIAGNOSIS — Z02.89 ENCOUNTER FOR COMPLETION OF FORM WITH PATIENT: ICD-10-CM

## 2022-04-01 DIAGNOSIS — R73.09 ELEVATED GLUCOSE: ICD-10-CM

## 2022-04-01 PROBLEM — R07.9 PAIN IN THE CHEST: Status: RESOLVED | Noted: 2021-11-11 | Resolved: 2022-04-01

## 2022-04-01 PROCEDURE — 99212 OFFICE O/P EST SF 10 MIN: CPT | Performed by: NURSE PRACTITIONER

## 2022-04-01 ASSESSMENT — PATIENT HEALTH QUESTIONNAIRE - PHQ9: CLINICAL INTERPRETATION OF PHQ2 SCORE: 0

## 2022-04-01 ASSESSMENT — FIBROSIS 4 INDEX: FIB4 SCORE: 0.7

## 2022-04-01 NOTE — ASSESSMENT & PLAN NOTE
Last cardiology appointment was 3/2022. He reports that he should not be vaccinated before following up with cardiology.   He does not need to follow up with cardiology.

## 2022-04-01 NOTE — PROGRESS NOTES
"Subjective:     CC:  Diagnoses of Chest pain, unspecified type, Elevated glucose, and Other acute myocarditis - Moderna Covid Vaccine were pertinent to this visit.    HISTORY OF THE PRESENT ILLNESS: Patient is a 26 y.o. male. This pleasant patient is here today to establish care and discuss the following:    Paperwork completion  Patient was recently out of work due to the flu. He was seen at the  and prescribed Tamiflu and it was recommended that he take off work for 3 days. He is here today to complete paperwork for that leave. He is a  at [employer].    ROS:   Gen: no fevers/chills, no changes in weight  Eyes: no changes in vision  ENT: no sore throat, no hearing loss, no bloody nose  Pulm: no sob, no cough  CV: no chest pain, no palpitations  GI: no nausea/vomiting, no diarrhea  : no dysuria  MSk: no myalgias  Skin: no rash  Neuro: no headaches, no numbness/tingling  Heme/Lymph: no easy bruising        - NOTE: All other systems reviewed and are negative, except as in HPI.      Objective:     Exam: /76 (BP Location: Right arm, Patient Position: Sitting, BP Cuff Size: Large adult)   Pulse 78   Temp 36.6 °C (97.9 °F)   Resp 14   Ht 1.727 m (5' 8\")   Wt (!) 131 kg (289 lb 6.4 oz)   SpO2 97%  Body mass index is 44 kg/m².    Constitutional: Alert, no distress, well-groomed.  Skin: Warm, dry, good turgor, no rashes in visible areas.  Eye: Equal, round and reactive, conjunctiva clear, lids normal.  ENMT: Lips without lesions, good dentition, moist mucous membranes.  Neck: Trachea midline, no masses, no thyromegaly.  Respiratory: Unlabored respiratory effort, no cough. Clear to ausculation. No rales, ronchi, or wheezing.  Cardiovascular: Regular rate and rhythm without murmur. Carotid and radial pulses are intact and equal bilaterally.  MSK: Normal gait, moves all extremities.  Neuro: Grossly non-focal.   Psych: Alert and oriented x3, normal affect and mood.    Assessment & Plan:   26 y.o. " male with the following -    1. Encounter for completion of form with patient  Paperwork completed in office and original was returned to patient. He is cleared to return to work.     2. Elevated glucose  Elevated glucose during hospitalization in November. Labs ordered.  - TSH WITH REFLEX TO FT4; Future  - VITAMIN D,25 HYDROXY; Future  - HEMOGLOBIN A1C; Future

## 2022-09-01 ENCOUNTER — OFFICE VISIT (OUTPATIENT)
Dept: URGENT CARE | Facility: CLINIC | Age: 27
End: 2022-09-01
Payer: COMMERCIAL

## 2022-09-01 VITALS
SYSTOLIC BLOOD PRESSURE: 148 MMHG | DIASTOLIC BLOOD PRESSURE: 90 MMHG | TEMPERATURE: 99.4 F | HEART RATE: 84 BPM | OXYGEN SATURATION: 100 % | WEIGHT: 281.8 LBS | HEIGHT: 68 IN | BODY MASS INDEX: 42.71 KG/M2

## 2022-09-01 DIAGNOSIS — J02.9 SORE THROAT: ICD-10-CM

## 2022-09-01 DIAGNOSIS — R05.9 COUGH: ICD-10-CM

## 2022-09-01 DIAGNOSIS — M79.10 MYALGIA: ICD-10-CM

## 2022-09-01 DIAGNOSIS — R09.81 NASAL CONGESTION: ICD-10-CM

## 2022-09-01 DIAGNOSIS — U07.1 COVID: ICD-10-CM

## 2022-09-01 DIAGNOSIS — Z86.79 HISTORY OF MYOCARDITIS: ICD-10-CM

## 2022-09-01 DIAGNOSIS — R50.9 LOW GRADE FEVER: ICD-10-CM

## 2022-09-01 LAB
EXTERNAL QUALITY CONTROL: ABNORMAL
INT CON NEG: ABNORMAL
INT CON NEG: NORMAL
INT CON POS: ABNORMAL
INT CON POS: NORMAL
S PYO AG THROAT QL: NEGATIVE
SARS-COV+SARS-COV-2 AG RESP QL IA.RAPID: POSITIVE

## 2022-09-01 PROCEDURE — 87426 SARSCOV CORONAVIRUS AG IA: CPT | Performed by: NURSE PRACTITIONER

## 2022-09-01 PROCEDURE — 87880 STREP A ASSAY W/OPTIC: CPT | Performed by: NURSE PRACTITIONER

## 2022-09-01 PROCEDURE — 99214 OFFICE O/P EST MOD 30 MIN: CPT | Mod: 25,CS | Performed by: NURSE PRACTITIONER

## 2022-09-01 ASSESSMENT — FIBROSIS 4 INDEX: FIB4 SCORE: 0.73

## 2022-09-01 NOTE — PROGRESS NOTES
"Subjective:   Angelito Rodriguez is a 27 y.o. male who presents for Fever (BODY ACHES/NASAL CONGESTION/SORE THROAT/X2DAYS)       HPI  Patient presents for evaluation of 2-day history of low-grade fever, myalgia, nasal congestion, sore throat, and mild cough.  Patient states he was exposed to COVID via his parents.  Patient declined COVID test which was positive.  He has taken Tylenol for his symptoms, which have been helpful.  Patient's has had 1 dose of COVID-vaccine, however suffered myocarditis after the vaccine, requiring 2-day inpatient hospitalization.  Patient denies any current chest pain, chest pressure, shortness of breath.    ROS  All other systems are negative except as documented above within HPI.    MEDS:   Current Outpatient Medications:     Acetaminophen (TYLENOL 8 HOUR PO), Take  by mouth., Disp: , Rfl:   ALLERGIES: No Known Allergies    Patient's PMH, SocHx, SurgHx, FamHx, Drug allergies and medications were reviewed.     Objective:   BP (!) 148/90 (BP Location: Right arm, Patient Position: Sitting)   Pulse 84   Temp 37.4 °C (99.4 °F) (Temporal)   Ht 1.727 m (5' 8\")   Wt (!) 128 kg (281 lb 12.8 oz)   SpO2 100%   BMI 42.85 kg/m²     Physical Exam  Vitals and nursing note reviewed.   Constitutional:       General: He is awake.      Appearance: Normal appearance. He is well-developed and normal weight.   HENT:      Head: Normocephalic and atraumatic.      Right Ear: Tympanic membrane, ear canal and external ear normal.      Left Ear: Tympanic membrane, ear canal and external ear normal.      Nose: Nose normal.      Mouth/Throat:      Lips: Pink.      Mouth: Mucous membranes are moist.      Pharynx: Oropharynx is clear. Uvula midline.   Eyes:      Extraocular Movements: Extraocular movements intact.      Conjunctiva/sclera: Conjunctivae normal.      Pupils: Pupils are equal, round, and reactive to light.   Neck:      Thyroid: No thyromegaly.      Trachea: Trachea normal.   Cardiovascular:      " Rate and Rhythm: Normal rate and regular rhythm.      Pulses: Normal pulses.      Heart sounds: Normal heart sounds, S1 normal and S2 normal.   Pulmonary:      Effort: Pulmonary effort is normal. No respiratory distress.      Breath sounds: Normal breath sounds. No wheezing, rhonchi or rales.   Abdominal:      General: Bowel sounds are normal.      Palpations: Abdomen is soft.   Musculoskeletal:         General: Normal range of motion.      Cervical back: Full passive range of motion without pain, normal range of motion and neck supple.   Lymphadenopathy:      Cervical: No cervical adenopathy.   Skin:     General: Skin is warm and dry.      Capillary Refill: Capillary refill takes less than 2 seconds.   Neurological:      General: No focal deficit present.      Mental Status: He is alert and oriented to person, place, and time.      Gait: Gait is intact.   Psychiatric:         Attention and Perception: Attention and perception normal.         Mood and Affect: Mood normal.         Speech: Speech normal.         Behavior: Behavior normal. Behavior is cooperative.         Thought Content: Thought content normal.         Judgment: Judgment normal.       Assessment/Plan:   Assessment      1. COVID+    2. History of myocarditis    3. Cough  - POCT Rapid Strep A  - POCT SARS-COV Antigen ELISE Manual Result    4. Myalgia  - POCT Rapid Strep A  - POCT SARS-COV Antigen ELISE Manual Result    5. Nasal congestion  - POCT Rapid Strep A  - POCT SARS-COV Antigen ELISE Manual Result    6. Low grade fever  - POCT Rapid Strep A  - POCT SARS-COV Antigen ELISE Manual Result    7. Sore throat  - POCT Rapid Strep A  - POCT SARS-COV Antigen ELISE Manual Result      Vital signs stable at today's acute urgent care visit.  Review of any test results completed in clinic.      Due to patient's myocarditis after receiving Moderna COVID-vaccine, strict red flags discussed and indications to immediately call 911 or present to the ED. All questions were  encouraged and answered to the patient's satisfaction and understanding, and they agree to the plan of care.     This is an acute problem with uncertain prognosis, medication management and instructions as well as management options were provided.  I personally reviewed prior external notes and test results pertinent to today and independently reviewed and interpreted all diagnostics. Time spent evaluating this patient includes preparing for visit, counseling/education, exam, evaluation, obtaining history, and ordering lab/test/procedures.      Please note that this dictation was created using voice recognition software. I have made a reasonable attempt to correct obvious errors, but I expect that there are errors of grammar and possibly content that I did not discover before finalizing the note.

## 2022-09-01 NOTE — LETTER
September 1, 2022      Angelito Rodriguez    Your employee/student was seen in our clinic today.  A concern for COVID-19 has been identified. We are asking you to excuse absences while following self-isolation protocol per Center for Disease Control (CDC) guidelines.  The patient will be able to access test results through our electronic delivery system called [employer].     Due to the results of testing being positive then the patient should follow the CDC guidelines.  These are isolation for minimum of 5 days and at least 24 hours have passed since the last fever without the use of fever reducing medications and all other symptoms have improved.      Please excuse him from work on the dates of 91/ and 9/2 due to this illness.    In general, repeat testing is not necessary and not offered through our [facility]. This is the only note that will be provided from [facility] for this visit.  Your employee/student will require an appointment with a primary care provider if FMLA or disability forms are required. If you have any questions please do not hesitate to call me at the phone number listed below.    Sincerely,    Deb Jackson, APRN  [phone]  Electronically Signed

## 2023-03-05 ENCOUNTER — HOSPITAL ENCOUNTER (OUTPATIENT)
Facility: MEDICAL CENTER | Age: 28
End: 2023-03-05
Attending: FAMILY MEDICINE
Payer: COMMERCIAL

## 2023-03-05 ENCOUNTER — OFFICE VISIT (OUTPATIENT)
Dept: URGENT CARE | Facility: CLINIC | Age: 28
End: 2023-03-05
Payer: COMMERCIAL

## 2023-03-05 VITALS
HEIGHT: 68 IN | SYSTOLIC BLOOD PRESSURE: 154 MMHG | RESPIRATION RATE: 18 BRPM | OXYGEN SATURATION: 97 % | DIASTOLIC BLOOD PRESSURE: 96 MMHG | WEIGHT: 276 LBS | TEMPERATURE: 97.4 F | BODY MASS INDEX: 41.83 KG/M2 | HEART RATE: 96 BPM

## 2023-03-05 DIAGNOSIS — R31.0 GROSS HEMATURIA: ICD-10-CM

## 2023-03-05 DIAGNOSIS — R82.998 DARK BROWN URINE: ICD-10-CM

## 2023-03-05 LAB
APPEARANCE UR: CLEAR
BILIRUB UR STRIP-MCNC: NEGATIVE MG/DL
COLOR UR AUTO: YELLOW
GLUCOSE UR STRIP.AUTO-MCNC: NEGATIVE MG/DL
KETONES UR STRIP.AUTO-MCNC: NEGATIVE MG/DL
LEUKOCYTE ESTERASE UR QL STRIP.AUTO: NEGATIVE
NITRITE UR QL STRIP.AUTO: NEGATIVE
PH UR STRIP.AUTO: 7 [PH] (ref 5–8)
PROT UR QL STRIP: NEGATIVE MG/DL
RBC UR QL AUTO: NORMAL
SP GR UR STRIP.AUTO: 1.01
UROBILINOGEN UR STRIP-MCNC: 0.2 MG/DL

## 2023-03-05 PROCEDURE — 99214 OFFICE O/P EST MOD 30 MIN: CPT | Performed by: FAMILY MEDICINE

## 2023-03-05 PROCEDURE — 81002 URINALYSIS NONAUTO W/O SCOPE: CPT | Performed by: FAMILY MEDICINE

## 2023-03-05 PROCEDURE — 87086 URINE CULTURE/COLONY COUNT: CPT

## 2023-03-05 ASSESSMENT — FIBROSIS 4 INDEX: FIB4 SCORE: 0.73

## 2023-03-05 NOTE — PROGRESS NOTES
"Subjective:     Angelito Rodriguez is a 27 y.o. male who presents for Blood in Urine (Brown urine) and Back Pain (R side )    HPI  Pt presents for evaluation of an acute problem  Pt with dark urine which just started today   Has been trying to drink increased water since it started, but no major change yet   Urine is a brown color   Having some mild pain in the right low back intermittently   Has mildly sore muscles from lifting \"max\" weights at the gym recently trying to set new personal bests   Not feeling ill otherwise   No fevers, headache, sore throat, congestion, cough   No vomiting or diarrhea   Does not feel fatigued or ill otherwise   Has never had urine dark like this before     Review of Systems   Constitutional:  Negative for fever.   HENT:  Negative for sore throat.    Respiratory:  Negative for cough.    Cardiovascular:  Negative for chest pain.   Gastrointestinal:  Negative for vomiting.   Neurological:  Negative for dizziness and headaches.     PMH:  has a past medical history of Cold, Myocarditis (11/11/2021), Other acute myocarditis - Moderna Covid Vaccine (11/11/2021), and Tonsillitis.  MEDS: No current outpatient medications on file.  ALLERGIES: No Known Allergies  SURGHX:   Past Surgical History:   Procedure Laterality Date    TONSILLECTOMY AND ADENOIDECTOMY  2/3/2011    [facility]    EXAM UNDER ANESTHESIA  2/3/2011    Performed by NOELLE ABEBE at SURGERY SAME DAY [facility] ORS     SOCHX:  reports that he has never smoked. He has never used smokeless tobacco. He reports current alcohol use of about 1.2 oz per week. He reports that he does not use drugs.     Objective:   BP (!) 154/96 (BP Location: Left arm, Patient Position: Sitting, BP Cuff Size: Adult)   Pulse 96   Temp 36.3 °C (97.4 °F) (Temporal)   Resp 18   Ht 1.727 m (5' 8\")   Wt (!) 125 kg (276 lb)   SpO2 97%   BMI 41.97 kg/m²     Physical Exam  Constitutional:       General: He " is not in acute distress.     Appearance: He is well-developed. He is not diaphoretic.   Pulmonary:      Effort: Pulmonary effort is normal.   Abdominal:      General: Abdomen is flat. Bowel sounds are normal. There is no distension.      Palpations: Abdomen is soft.      Tenderness: There is no abdominal tenderness. There is no right CVA tenderness, left CVA tenderness, guarding or rebound.   Neurological:      Mental Status: He is alert.       Assessment/Plan:   Assessment    1. Dark brown urine  - POCT Urinalysis  - URINE CULTURE(NEW); Future  - CREATINE KINASE; Future  - Basic Metabolic Panel; Future    2. Gross hematuria  - POCT Urinalysis  - URINE CULTURE(NEW); Future  - CREATINE KINASE; Future  - Basic Metabolic Panel; Future    Pt with dark urine.  POC urine shows large blood and no other abnormality.  Has mild right flank pain intermittently, but no current back pain or tenderness.  No recent flu like illness.  Takes no medications.  No recent vaccinations.  Unsure if the urine findings are truly blood or could possibly be myoglobin.  Had recent workouts at the gym which were much harder than usual.  May have a mild case of rhabdomyolysis.  He is tolerating PO intake well and does not appear acutely ill.  Recommended further workup with labs and pt is agreeable.  Emphasized importance of drinking large amounts of water the next few day, as there is risk for BETSY if he truly does have rhabdo.  Pt agreeable and stat labs were ordered.

## 2023-03-06 ENCOUNTER — HOSPITAL ENCOUNTER (OUTPATIENT)
Dept: LAB | Facility: MEDICAL CENTER | Age: 28
End: 2023-03-06
Attending: FAMILY MEDICINE
Payer: COMMERCIAL

## 2023-03-06 DIAGNOSIS — R31.0 GROSS HEMATURIA: ICD-10-CM

## 2023-03-06 DIAGNOSIS — R82.998 DARK BROWN URINE: ICD-10-CM

## 2023-03-06 LAB
ANION GAP SERPL CALC-SCNC: 11 MMOL/L (ref 7–16)
BUN SERPL-MCNC: 8 MG/DL (ref 8–22)
CALCIUM SERPL-MCNC: 9.7 MG/DL (ref 8.5–10.5)
CHLORIDE SERPL-SCNC: 99 MMOL/L (ref 96–112)
CK SERPL-CCNC: ABNORMAL U/L (ref 0–154)
CO2 SERPL-SCNC: 26 MMOL/L (ref 20–33)
CREAT SERPL-MCNC: 0.58 MG/DL (ref 0.5–1.4)
GFR SERPLBLD CREATININE-BSD FMLA CKD-EPI: 138 ML/MIN/1.73 M 2
GLUCOSE SERPL-MCNC: 90 MG/DL (ref 65–99)
POTASSIUM SERPL-SCNC: 4.7 MMOL/L (ref 3.6–5.5)
SODIUM SERPL-SCNC: 136 MMOL/L (ref 135–145)

## 2023-03-06 PROCEDURE — 36415 COLL VENOUS BLD VENIPUNCTURE: CPT

## 2023-03-06 PROCEDURE — 82550 ASSAY OF CK (CPK): CPT

## 2023-03-06 PROCEDURE — 80048 BASIC METABOLIC PNL TOTAL CA: CPT

## 2023-03-07 ENCOUNTER — HOSPITAL ENCOUNTER (OUTPATIENT)
Dept: LAB | Facility: MEDICAL CENTER | Age: 28
End: 2023-03-07
Attending: FAMILY MEDICINE
Payer: COMMERCIAL

## 2023-03-07 ENCOUNTER — TELEPHONE (OUTPATIENT)
Dept: MEDICAL GROUP | Facility: IMAGING CENTER | Age: 28
End: 2023-03-07
Payer: COMMERCIAL

## 2023-03-07 DIAGNOSIS — M62.82 NON-TRAUMATIC RHABDOMYOLYSIS: ICD-10-CM

## 2023-03-07 LAB
ALBUMIN SERPL BCP-MCNC: 4.6 G/DL (ref 3.2–4.9)
ALBUMIN/GLOB SERPL: 1.6 G/DL
ALP SERPL-CCNC: 80 U/L (ref 30–99)
ALT SERPL-CCNC: 327 U/L (ref 2–50)
ANION GAP SERPL CALC-SCNC: 10 MMOL/L (ref 7–16)
AST SERPL-CCNC: 734 U/L (ref 12–45)
BACTERIA UR CULT: NORMAL
BASOPHILS # BLD AUTO: 0.2 % (ref 0–1.8)
BASOPHILS # BLD: 0.02 K/UL (ref 0–0.12)
BILIRUB SERPL-MCNC: 0.7 MG/DL (ref 0.1–1.5)
BUN SERPL-MCNC: 12 MG/DL (ref 8–22)
CALCIUM ALBUM COR SERPL-MCNC: 9.3 MG/DL (ref 8.5–10.5)
CALCIUM SERPL-MCNC: 9.8 MG/DL (ref 8.5–10.5)
CHLORIDE SERPL-SCNC: 101 MMOL/L (ref 96–112)
CK SERPL-CCNC: ABNORMAL U/L (ref 0–154)
CO2 SERPL-SCNC: 28 MMOL/L (ref 20–33)
CREAT SERPL-MCNC: 0.51 MG/DL (ref 0.5–1.4)
EOSINOPHIL # BLD AUTO: 0.22 K/UL (ref 0–0.51)
EOSINOPHIL NFR BLD: 2.3 % (ref 0–6.9)
ERYTHROCYTE [DISTWIDTH] IN BLOOD BY AUTOMATED COUNT: 38.5 FL (ref 35.9–50)
GFR SERPLBLD CREATININE-BSD FMLA CKD-EPI: 144 ML/MIN/1.73 M 2
GLOBULIN SER CALC-MCNC: 2.9 G/DL (ref 1.9–3.5)
GLUCOSE SERPL-MCNC: 91 MG/DL (ref 65–99)
HCT VFR BLD AUTO: 46.2 % (ref 42–52)
HGB BLD-MCNC: 15.8 G/DL (ref 14–18)
IMM GRANULOCYTES # BLD AUTO: 0.04 K/UL (ref 0–0.11)
IMM GRANULOCYTES NFR BLD AUTO: 0.4 % (ref 0–0.9)
LYMPHOCYTES # BLD AUTO: 2.13 K/UL (ref 1–4.8)
LYMPHOCYTES NFR BLD: 22.4 % (ref 22–41)
MCH RBC QN AUTO: 29.6 PG (ref 27–33)
MCHC RBC AUTO-ENTMCNC: 34.2 G/DL (ref 33.7–35.3)
MCV RBC AUTO: 86.5 FL (ref 81.4–97.8)
MONOCYTES # BLD AUTO: 0.61 K/UL (ref 0–0.85)
MONOCYTES NFR BLD AUTO: 6.4 % (ref 0–13.4)
NEUTROPHILS # BLD AUTO: 6.51 K/UL (ref 1.82–7.42)
NEUTROPHILS NFR BLD: 68.3 % (ref 44–72)
NRBC # BLD AUTO: 0 K/UL
NRBC BLD-RTO: 0 /100 WBC
PLATELET # BLD AUTO: 225 K/UL (ref 164–446)
PMV BLD AUTO: 11.6 FL (ref 9–12.9)
POTASSIUM SERPL-SCNC: 4.4 MMOL/L (ref 3.6–5.5)
PROT SERPL-MCNC: 7.5 G/DL (ref 6–8.2)
RBC # BLD AUTO: 5.34 M/UL (ref 4.7–6.1)
SIGNIFICANT IND 70042: NORMAL
SITE SITE: NORMAL
SODIUM SERPL-SCNC: 139 MMOL/L (ref 135–145)
SOURCE SOURCE: NORMAL
WBC # BLD AUTO: 9.5 K/UL (ref 4.8–10.8)

## 2023-03-07 PROCEDURE — 80053 COMPREHEN METABOLIC PANEL: CPT

## 2023-03-07 PROCEDURE — 85025 COMPLETE CBC W/AUTO DIFF WBC: CPT

## 2023-03-07 PROCEDURE — 82550 ASSAY OF CK (CPK): CPT

## 2023-03-07 PROCEDURE — 36415 COLL VENOUS BLD VENIPUNCTURE: CPT

## 2023-03-07 ASSESSMENT — ENCOUNTER SYMPTOMS
COUGH: 0
SORE THROAT: 0
DIZZINESS: 0
FEVER: 0
HEADACHES: 0
VOMITING: 0

## 2023-03-07 NOTE — TELEPHONE ENCOUNTER
Called again and spoke with patient.  Has been drinking lots of water since office visit and urine is now back to normal.  He has no other symptoms.  Recommended continued PO hydration and to continue drinking excess the next few days to ensure he fully clears this process.  No weight lifting yet.  Will recheck labs today after work, or tomorrow to ensure CPK decreasing.

## 2023-03-07 NOTE — TELEPHONE ENCOUNTER
Called to discuss lab results with patient.  No answer, and voicemail is not set up.  Will continue to try to make contact.

## 2023-03-08 ENCOUNTER — APPOINTMENT (OUTPATIENT)
Dept: RADIOLOGY | Facility: MEDICAL CENTER | Age: 28
End: 2023-03-08
Attending: EMERGENCY MEDICINE
Payer: COMMERCIAL

## 2023-03-08 ENCOUNTER — TELEPHONE (OUTPATIENT)
Dept: MEDICAL GROUP | Facility: IMAGING CENTER | Age: 28
End: 2023-03-08
Payer: COMMERCIAL

## 2023-03-08 ENCOUNTER — HOSPITAL ENCOUNTER (EMERGENCY)
Facility: MEDICAL CENTER | Age: 28
End: 2023-03-08
Attending: EMERGENCY MEDICINE
Payer: COMMERCIAL

## 2023-03-08 VITALS
TEMPERATURE: 97.9 F | WEIGHT: 273.15 LBS | BODY MASS INDEX: 41.4 KG/M2 | DIASTOLIC BLOOD PRESSURE: 75 MMHG | SYSTOLIC BLOOD PRESSURE: 134 MMHG | RESPIRATION RATE: 18 BRPM | HEART RATE: 89 BPM | OXYGEN SATURATION: 98 % | HEIGHT: 68 IN

## 2023-03-08 DIAGNOSIS — T79.6XXD TRAUMATIC RHABDOMYOLYSIS, SUBSEQUENT ENCOUNTER: ICD-10-CM

## 2023-03-08 LAB
ALBUMIN SERPL BCP-MCNC: 4.4 G/DL (ref 3.2–4.9)
ALBUMIN/GLOB SERPL: 1.4 G/DL
ALP SERPL-CCNC: 81 U/L (ref 30–99)
ALT SERPL-CCNC: 265 U/L (ref 2–50)
ANION GAP SERPL CALC-SCNC: 11 MMOL/L (ref 7–16)
APPEARANCE UR: CLEAR
AST SERPL-CCNC: 392 U/L (ref 12–45)
BASOPHILS # BLD AUTO: 0.4 % (ref 0–1.8)
BASOPHILS # BLD: 0.04 K/UL (ref 0–0.12)
BILIRUB SERPL-MCNC: 0.7 MG/DL (ref 0.1–1.5)
BILIRUB UR QL STRIP.AUTO: NEGATIVE
BUN SERPL-MCNC: 10 MG/DL (ref 8–22)
CALCIUM ALBUM COR SERPL-MCNC: 9.3 MG/DL (ref 8.5–10.5)
CALCIUM SERPL-MCNC: 9.6 MG/DL (ref 8.4–10.2)
CHLORIDE SERPL-SCNC: 100 MMOL/L (ref 96–112)
CK SERPL-CCNC: ABNORMAL U/L (ref 0–154)
CO2 SERPL-SCNC: 26 MMOL/L (ref 20–33)
COLOR UR: YELLOW
CREAT SERPL-MCNC: 0.67 MG/DL (ref 0.5–1.4)
EOSINOPHIL # BLD AUTO: 0.16 K/UL (ref 0–0.51)
EOSINOPHIL NFR BLD: 1.6 % (ref 0–6.9)
ERYTHROCYTE [DISTWIDTH] IN BLOOD BY AUTOMATED COUNT: 37.5 FL (ref 35.9–50)
GFR SERPLBLD CREATININE-BSD FMLA CKD-EPI: 132 ML/MIN/1.73 M 2
GLOBULIN SER CALC-MCNC: 3.2 G/DL (ref 1.9–3.5)
GLUCOSE SERPL-MCNC: 92 MG/DL (ref 65–99)
GLUCOSE UR STRIP.AUTO-MCNC: NEGATIVE MG/DL
HCT VFR BLD AUTO: 47.5 % (ref 42–52)
HGB BLD-MCNC: 16.2 G/DL (ref 14–18)
IMM GRANULOCYTES # BLD AUTO: 0.04 K/UL (ref 0–0.11)
IMM GRANULOCYTES NFR BLD AUTO: 0.4 % (ref 0–0.9)
KETONES UR STRIP.AUTO-MCNC: 15 MG/DL
LEUKOCYTE ESTERASE UR QL STRIP.AUTO: NEGATIVE
LYMPHOCYTES # BLD AUTO: 1.89 K/UL (ref 1–4.8)
LYMPHOCYTES NFR BLD: 18.4 % (ref 22–41)
MCH RBC QN AUTO: 29.5 PG (ref 27–33)
MCHC RBC AUTO-ENTMCNC: 34.1 G/DL (ref 33.7–35.3)
MCV RBC AUTO: 86.5 FL (ref 81.4–97.8)
MICRO URNS: ABNORMAL
MONOCYTES # BLD AUTO: 0.59 K/UL (ref 0–0.85)
MONOCYTES NFR BLD AUTO: 5.7 % (ref 0–13.4)
NEUTROPHILS # BLD AUTO: 7.56 K/UL (ref 1.82–7.42)
NEUTROPHILS NFR BLD: 73.5 % (ref 44–72)
NITRITE UR QL STRIP.AUTO: NEGATIVE
NRBC # BLD AUTO: 0 K/UL
NRBC BLD-RTO: 0 /100 WBC
PH UR STRIP.AUTO: 6 [PH] (ref 5–8)
PLATELET # BLD AUTO: 230 K/UL (ref 164–446)
PMV BLD AUTO: 11.1 FL (ref 9–12.9)
POTASSIUM SERPL-SCNC: 4.4 MMOL/L (ref 3.6–5.5)
PROT SERPL-MCNC: 7.6 G/DL (ref 6–8.2)
PROT UR QL STRIP: NEGATIVE MG/DL
RBC # BLD AUTO: 5.49 M/UL (ref 4.7–6.1)
RBC UR QL AUTO: NEGATIVE
SODIUM SERPL-SCNC: 137 MMOL/L (ref 135–145)
SP GR UR STRIP.AUTO: <=1.005
WBC # BLD AUTO: 10.3 K/UL (ref 4.8–10.8)

## 2023-03-08 PROCEDURE — 81003 URINALYSIS AUTO W/O SCOPE: CPT

## 2023-03-08 PROCEDURE — 700105 HCHG RX REV CODE 258: Performed by: EMERGENCY MEDICINE

## 2023-03-08 PROCEDURE — 80053 COMPREHEN METABOLIC PANEL: CPT

## 2023-03-08 PROCEDURE — 76705 ECHO EXAM OF ABDOMEN: CPT

## 2023-03-08 PROCEDURE — 85025 COMPLETE CBC W/AUTO DIFF WBC: CPT

## 2023-03-08 PROCEDURE — 82550 ASSAY OF CK (CPK): CPT

## 2023-03-08 PROCEDURE — 99284 EMERGENCY DEPT VISIT MOD MDM: CPT

## 2023-03-08 RX ORDER — SODIUM CHLORIDE, SODIUM LACTATE, POTASSIUM CHLORIDE, CALCIUM CHLORIDE 600; 310; 30; 20 MG/100ML; MG/100ML; MG/100ML; MG/100ML
1000 INJECTION, SOLUTION INTRAVENOUS ONCE
Status: COMPLETED | OUTPATIENT
Start: 2023-03-08 | End: 2023-03-08

## 2023-03-08 RX ADMIN — SODIUM CHLORIDE, POTASSIUM CHLORIDE, SODIUM LACTATE AND CALCIUM CHLORIDE 1000 ML: 600; 310; 30; 20 INJECTION, SOLUTION INTRAVENOUS at 16:43

## 2023-03-08 ASSESSMENT — FIBROSIS 4 INDEX: FIB4 SCORE: 4.51

## 2023-03-08 NOTE — ED PROVIDER NOTES
"ED Provider Note    CHIEF COMPLAINT  Chief Complaint   Patient presents with    Abnormal Labs     Pt reports he was working out lifting wts last week Noted   \" Dark brown colored urine \"  Pt states he went to [facility] on Sunday Told his CPK was 22,000  Pt states he drank lots of fluids and his urine is now a normal color and he feels fine       EXTERNAL RECORDS REVIEWED  Outpatient labs & studies on 3/6 showed a cpk of greater than 22,000 and cpk on 3/7/ of greater than 22,000    HPI/ROS  LIMITATION TO HISTORY   Select: : None  OUTSIDE HISTORIAN(S):  None    Angelito Rodriguez is a 27 y.o. male who presents for repeat evaluation of his elevated CPK level greater than 22,000.  The patient was seen at [facility] urgent care on the fifth of this month (3 days ago) because of dark brown urine and pain in his right back.  He states that at the time he was lifting a lot of weights and had some soreness in his muscles from his gym sessions.  He had not been feeling well otherwise.  When he noticed the dark urine he started increasing his water intake.  He had a repeat CPK performed yesterday that was also greater than 22,000.  Patient states that he has still been drinking water and his urine is not dark anymore.  On his repeat blood work done yesterday he also had elevated liver function tests.  Overall the patient states that he is feeling improved.  He states that his urine is more of a normal color and the soreness that was mainly in his bilateral thighs is markedly improved.  He denies any pain in his abdomen diarrhea nausea or vomiting.  He denies any smoking or other drug use.  He does use alcohol on occasion.    PAST MEDICAL HISTORY   has a past medical history of Cold, Myocarditis (11/11/2021), Other acute myocarditis - Moderna Covid Vaccine (11/11/2021), and Tonsillitis.    SURGICAL HISTORY   has a past surgical history that includes tonsillectomy and adenoidectomy (2/3/2011) and exam under anesthesia " "(2/3/2011).    FAMILY HISTORY  Family History   Problem Relation Age of Onset    Cancer Maternal Grandfather         pancreatic    Hypertension Paternal Grandmother     Stroke Paternal Grandmother     Diabetes Paternal Grandmother        SOCIAL HISTORY  Social History     Tobacco Use    Smoking status: Never    Smokeless tobacco: Never   Vaping Use    Vaping Use: Never used   Substance and Sexual Activity    Alcohol use: Yes     Alcohol/week: 1.2 oz     Types: 2 Standard drinks or equivalent per week    Drug use: No    Sexual activity: Not Currently       CURRENT MEDICATIONS  Home Medications    **Home medications have not yet been reviewed for this encounter**         ALLERGIES  No Known Allergies    PHYSICAL EXAM  VITAL SIGNS: /77   Pulse 81   Temp 36.6 °C (97.9 °F) (Temporal)   Resp 16   Ht 1.727 m (5' 8\")   Wt 124 kg (273 lb 2.4 oz)   SpO2 98%   BMI 41.53 kg/m²      Constitutional: Well developed, Well nourished, No acute distress, Non-toxic appearance.   HEENT: Normocephalic, Atraumatic,  external ears normal, pharynx pink,  Mucous  Membranes moist, No rhinorrhea or mucosal edema  Eyes: PERRL, EOMI, Conjunctiva normal, No discharge.   Neck: Normal range of motion, No tenderness, Supple, No stridor.   Lymphatic: No lymphadenopathy    Cardiovascular: Regular Rate and Rhythm, No murmurs,  rubs, or gallops.   Thorax & Lungs: Lungs clear to auscultation bilaterally, No respiratory distress, No wheezes, rhales or rhonchi, No chest wall tenderness.   Abdomen: Bowel sounds normal, Soft, non tender, non distended,  No pulsatile masses., no rebound guarding or peritoneal signs.   Skin: Warm, Dry, No erythema, No rash,   Back:  No CVA tenderness,  No spinal tenderness, bony crepitance step offs or instability.   Extremities: Equal, intact distal pulses, No cyanosis, clubbing or edema,  No tenderness.   Musculoskeletal: Good range of motion in all major joints. No tenderness to palpation or major deformities " noted.   Neurologic: Alert & oriented x 3, No focal deficits noted.       DIAGNOSTIC STUDIES / PROCEDURES  EKG  I have independently interpreted this EKG  None    LABS  Results for orders placed or performed during the hospital encounter of 03/08/23   CBC WITH DIFFERENTIAL   Result Value Ref Range    WBC 10.3 4.8 - 10.8 K/uL    RBC 5.49 4.70 - 6.10 M/uL    Hemoglobin 16.2 14.0 - 18.0 g/dL    Hematocrit 47.5 42.0 - 52.0 %    MCV 86.5 81.4 - 97.8 fL    MCH 29.5 27.0 - 33.0 pg    MCHC 34.1 33.7 - 35.3 g/dL    RDW 37.5 35.9 - 50.0 fL    Platelet Count 230 164 - 446 K/uL    MPV 11.1 9.0 - 12.9 fL    Neutrophils-Polys 73.50 (H) 44.00 - 72.00 %    Lymphocytes 18.40 (L) 22.00 - 41.00 %    Monocytes 5.70 0.00 - 13.40 %    Eosinophils 1.60 0.00 - 6.90 %    Basophils 0.40 0.00 - 1.80 %    Immature Granulocytes 0.40 0.00 - 0.90 %    Nucleated RBC 0.00 /100 WBC    Neutrophils (Absolute) 7.56 (H) 1.82 - 7.42 K/uL    Lymphs (Absolute) 1.89 1.00 - 4.80 K/uL    Monos (Absolute) 0.59 0.00 - 0.85 K/uL    Eos (Absolute) 0.16 0.00 - 0.51 K/uL    Baso (Absolute) 0.04 0.00 - 0.12 K/uL    Immature Granulocytes (abs) 0.04 0.00 - 0.11 K/uL    NRBC (Absolute) 0.00 K/uL   COMP METABOLIC PANEL   Result Value Ref Range    Sodium 137 135 - 145 mmol/L    Potassium 4.4 3.6 - 5.5 mmol/L    Chloride 100 96 - 112 mmol/L    Co2 26 20 - 33 mmol/L    Anion Gap 11.0 7.0 - 16.0    Glucose 92 65 - 99 mg/dL    Bun 10 8 - 22 mg/dL    Creatinine 0.67 0.50 - 1.40 mg/dL    Calcium 9.6 8.4 - 10.2 mg/dL    AST(SGOT) 392 (H) 12 - 45 U/L    ALT(SGPT) 265 (H) 2 - 50 U/L    Alkaline Phosphatase 81 30 - 99 U/L    Total Bilirubin 0.7 0.1 - 1.5 mg/dL    Albumin 4.4 3.2 - 4.9 g/dL    Total Protein 7.6 6.0 - 8.2 g/dL    Globulin 3.2 1.9 - 3.5 g/dL    A-G Ratio 1.4 g/dL   CREATINE KINASE   Result Value Ref Range    CPK Total 24039 (HH) 0 - 154 U/L   URINALYSIS,CULTURE IF INDICATED    Specimen: Urine, Clean Catch   Result Value Ref Range    Color Yellow     Character Clear      Specific Gravity <=1.005 <1.035    Ph 6.0 5.0 - 8.0    Glucose Negative Negative mg/dL    Ketones 15 (A) Negative mg/dL    Protein Negative Negative mg/dL    Bilirubin Negative Negative    Nitrite Negative Negative    Leukocyte Esterase Negative Negative    Occult Blood Negative Negative    Micro Urine Req see below    CORRECTED CALCIUM   Result Value Ref Range    Correct Calcium 9.3 8.5 - 10.5 mg/dL   ESTIMATED GFR   Result Value Ref Range    GFR (CKD-EPI) 132 >60 mL/min/1.73 m 2         RADIOLOGY  I have independently interpreted the diagnostic imaging associated with this visit and am waiting the final reading from the radiologist.   My preliminary interpretation is as follows: Gallbladder ultrasound positive stones in the gallbladder without gallbladder wall thickening  Radiologist interpretation:   US-RUQ   Final Result         1.  Hepatic steatosis.   2.  Gallbladder polyps measuring 5 and 6 mm respectively.   3.  No evidence of cholelithiasis.   4.  Incomplete visualization of the pancreas.            COURSE & MEDICAL DECISION MAKING    ED Observation Status? Yes; I am placing the patient in to an observation status due to a diagnostic uncertainty as well as therapeutic intensity. Patient placed in observation status at 3:12 PM, 3/8/2023.     Observation plan is as follows: us of the gallbladder for further evalutation of elevated LFTS, Repeat cpk, IV fluids    Upon Reevaluation, the patient's condition has: Improved; and will be discharged.    Patient discharged from ED Observation status at 6:08 PM  (Time) 3/8/23 (Date).     INITIAL ASSESSMENT, COURSE AND PLAN  Care Narrative: This is a 27-year-old male who did a very heavy workout on Friday and ended up having dark urine the next day.  He had an elevated CPK of greater than 22,000.  Upon recheck despite him drinking fluids at home and his urine clearing up he still had a elevated CPK greater than 22,000.  He has been sent here for repeat blood work IV  fluids and further work-up.  The patient's liver function tests were also elevated on his previous lab work yesterday.    HYDRATION: Based on the patient's presentation of Other Rhabdomyolysis the patient was given IV fluids. IV Hydration was used because oral hydration was not adequate alone. Upon recheck following hydration, the patient was improved.      ADDITIONAL PROBLEM LIST  none  DISPOSITION AND DISCUSSIONS    Patient's liver function tests are improving with an AST of 392 ALT of 265.  He has no pain in his right upper quadrant on exam and no jaundice.  His total bili is 0.7.  The patient's kidney function is normal with a BUN of 10 creatinine 0.67.  He has normal electrolytes.  His urine has 15 ketones but is otherwise negative for infection or protein.  Patient's CBC is unremarkable with a normal white count of 10.3 normal H&H 16.2 47.5 and normal platelet count of 230.  His CPK is down to 12,345 which is a marked improvement from the initial reading of greater than 22,000.  I gave the patient a liter of lactated Ringer's.  He has been stable in the emergency department.  Gallbladder ultrasound showed polyps but no stones or gallbladder wall thickening.  At this time I will discharge the patient home and advised him to continue drinking lots of fluids to flush out the rest of his muscle breakdown.  I advised him to perform cardiovascular exercise and not push himself so hard next time he works out.  If the patient has return of brown urine or abdominal pain or weakness he should return to the emergency department for recheck.  Otherwise he should follow-up with his primary care physician.  I have discussed management of the patient with the following physicians and CLEVE's:  none    Discussion of management with other QHP or appropriate source(s): None     Escalation of care considered, and ultimately not performed:acute inpatient care management, however at this time, the patient is most appropriate for  outpatient management    Barriers to care at this time, including but not limited to: none.     Decision tools and prescription drugs considered including, but not limited to:  none .    The patient will return for new or worsening symptoms and is stable at the time of discharge.    The patient is referred to a primary physician for blood pressure management, diabetic screening, and for all other preventative health concerns.      DISPOSITION:  Patient will be discharged home in stable condition.    FOLLOW UP:  MATTHEW MckeonP.R.N.  [id number] [address] [address] [phone]    Call in 1 day  to establish care, for recheck      OUTPATIENT MEDICATIONS:  New Prescriptions    No medications on file         FINAL DIAGNOSIS  1. Traumatic rhabdomyolysis, subsequent encounter           Electronically signed by: Yamile Fleming M.D., 3/8/2023 3:03 PM

## 2023-03-08 NOTE — TELEPHONE ENCOUNTER
Discussed critical CPK >27023 results with patient.   High levels most often means there has been injury or stress to muscle tissue, the heart, or the brain. Muscle tissue injury or muscle inflammation is most likely. This could also be associated with life-threatening disease associated with extreme enzyme elevations, electrolyte imbalances, and acute kidney injury.     Also, discussed elevated AST/ALT levels.      Advised patient to go to ED for immediate evaluation and care for this.     Patient agreeable.

## 2023-03-08 NOTE — TELEPHONE ENCOUNTER
On call note:   Call from lab- critical CPK > 22,000, continued elevation compared to prior.   Attempted to reach pt multiple times- appears no voicemail set up. After multiple attempts was able to finally reach patient and review labs.   Recommended go to ER for CPK findings and symptoms. Notes he has been drinking more fluids and urine seems to be more clear. Reviewed transaminitis also noted. Previously evaluated at  for dark brown/blood in urine. Patient agreeable with recommendations as above.     CC:AIDA Mckeon.

## 2023-03-09 NOTE — ED NOTES
Sekou from Lab called with critical result of CPK 12,345 at 1640. Critical lab result read back to Sekou.   Dr. Fleming notified of critical lab result at 1640.  Critical lab result read back by Dr. Fleming.

## 2023-03-17 ENCOUNTER — OFFICE VISIT (OUTPATIENT)
Dept: MEDICAL GROUP | Facility: LAB | Age: 28
End: 2023-03-17
Payer: COMMERCIAL

## 2023-03-17 VITALS
HEIGHT: 68 IN | BODY MASS INDEX: 41.28 KG/M2 | DIASTOLIC BLOOD PRESSURE: 66 MMHG | TEMPERATURE: 97.9 F | HEART RATE: 86 BPM | SYSTOLIC BLOOD PRESSURE: 118 MMHG | OXYGEN SATURATION: 93 % | WEIGHT: 272.4 LBS | RESPIRATION RATE: 14 BRPM

## 2023-03-17 DIAGNOSIS — G47.34 NOCTURNAL HYPOXEMIA: ICD-10-CM

## 2023-03-17 DIAGNOSIS — M62.82 NON-TRAUMATIC RHABDOMYOLYSIS: ICD-10-CM

## 2023-03-17 PROCEDURE — 99214 OFFICE O/P EST MOD 30 MIN: CPT | Performed by: NURSE PRACTITIONER

## 2023-03-17 ASSESSMENT — FIBROSIS 4 INDEX: FIB4 SCORE: 2.62

## 2023-03-17 ASSESSMENT — PATIENT HEALTH QUESTIONNAIRE - PHQ9: CLINICAL INTERPRETATION OF PHQ2 SCORE: 0

## 2023-03-17 NOTE — PROGRESS NOTES
Subjective:     CC:   Chief Complaint   Patient presents with    Follow-Up     Lab results      HPI:   Angelito presents today with the following:    Rhabdomyolysis  Was seen initially in the UC and then in the ER for dark urine and back pain. Was found initially to have an elevated CPK of greater than 22,000. He states that at the time he was lifting a lot of weights and had some soreness in his muscles from his gym sessions.  Repeat CPK was also greater than 22,000 and he was then told to follow up in the ER.  Also had elevated liver function tests.    He was given IV fluids in the ER, and lab results showed improvement with liver function tests AST of 392, ALT of 265.  Kidney function and electrolytes were normal.  CPK was down to 12,345.    Reports that he is feeling better, urine is normal and has no muscle soreness. He denies any pain in his abdomen diarrhea nausea or vomiting.  He denies any smoking or other drug use.      Nocturnal hypoxemia  Patient reports that he has been monitoring his O2 saturation with his watch while he sleeps and it will often be in the 80s. He has been told in the past that he stops breathing when he sleeps. He is interested in a sleep study at this time.     STOPBANG - Sleep Apnea Screening      Flowsheet Row Most Recent Value   S - Have you been told that you SNORE? No   T - Are you often TIRED during the day? Yes   O - Do you know if you stop breathing or has anyone witnessed you stop breathing while you were asleep? (OBSTRUCTION) Yes   P - Do you have high blood PRESSURE or on medication to control high blood pressure? No   B - Is your Body Mass Index greater than 35? (BMI) Yes   A - Are you 50 years old or older? (AGE) No   N - Are you a male with a NECK circumference greater than 17 inches, or a female with a neck circumference greater than 16 inches? Yes   G - Are you male? (GENDER) Yes   STOPBANG Total Score 5   LUDMILA Risk High Risk          ROS:   Gen: no fevers/chills, no  "changes in weight  Eyes: no changes in vision  ENT: no sore throat, no hearing loss, no bloody nose  Pulm: no sob, no cough  CV: no chest pain, no palpitations  GI: no nausea/vomiting, no diarrhea  : no dysuria  MSk: no myalgias  Skin: no rash  Neuro: no headaches, no numbness/tingling  Heme/Lymph: no easy bruising        - NOTE: All other systems reviewed and are negative, except as in HPI.    Objective:     Exam: /66 (BP Location: Right arm, Patient Position: Sitting, BP Cuff Size: Large adult)   Pulse 86   Temp 36.6 °C (97.9 °F)   Resp 14   Ht 1.727 m (5' 8\")   Wt 124 kg (272 lb 6.4 oz)   SpO2 93%  Body mass index is 41.42 kg/m².    Constitutional: Alert, no distress, well-groomed.  Skin: Warm, dry, good turgor, no rashes in visible areas.  Eye: Equal, round and reactive, conjunctiva clear, lids normal.  ENMT: Lips without lesions, good dentition, moist mucous membranes.  Neck: Trachea midline, no masses, no thyromegaly.  Respiratory: Unlabored respiratory effort, no cough.  MSK: Normal gait, moves all extremities.  Neuro: Grossly non-focal.   Psych: Alert and oriented x3, normal affect and mood.    Assessment & Plan:     27 y.o. male with the following -     1. Non-traumatic rhabdomyolysis  Follow up. Patient is feeling better overall. VSS. Will repeat labs. Discussed continuing to drink lots of fluids.  Discussed exercise to include cardiovascular exercise and to limit weight lifting. Supportive care and indications for immediate follow-up discussed with patient. Pathogenesis of diagnosis discussed including typical length and natural progression. Instructed to return to clinic or nearest emergency department for any change in condition, further concerns, or worsening of symptoms.  - CREATINE KINASE; Future  - Comp Metabolic Panel; Future    2. Nocturnal hypoxemia  Sleep study ordered. Recommend maintaining good sleep hygiene including: no caffeine after 3pm, no napping, using bedroom only for " sleep or sex, no TV or phones before bed. Continue to monitor.  - Polysomnography, 4 or More; Future  - Referral to Pulmonary and Sleep Medicine

## 2023-03-17 NOTE — PATIENT INSTRUCTIONS
SLEEP STUDY INSTRUCTIONS    1. Our main concern is to provide the best test and evaluation of your sleep and your cooperation in following the guidelines is very necessary.    2. We have no facilities for family members or guests at the sleep center. Special arrangements will be made for children requiring overnight sleep studies.    3. Unless otherwise instructed, AVOID alcoholic beverages on the day of your sleep study.    4. DO NOT drink coffee or caffeine-containing beverages after 12:00 noon on the day of your sleep study.    5. There is NO smoking at the sleep center.    6. Try to maintain a usual daytime schedule prior to the study (avoid unusual physical activity or meals).    7. DO NOT take a nap on the day of your study.    8. This is an outpatient procedure (test); therefore, nursing services, medications, and meals ARE NOT provided. If you take medications, bring them with you and take them on the schedule you do at home.    9. Please fill your sleep aid prescription (Ambien or Lunesta) and bring to your sleep study. Even patients who normally have no problem going to sleep often need a sleep aid in this different environment.    10. We ask that you wear conventional sleep attire (pajamas or sweats) for the sleep study. We discourage patients from wearing only their underwear to bed. We recommend two-piece pajamas as the techs will need to apply sensors to your stomach.    11. Please shampoo your hair the day of the sleep study. Please DO NOT use any other hair or skin products before your arrival (e.g., mousse, gel, hair or body spray, perfume, body lotion etc.) NOTE: Women should not wear heavy makeup prior to arrival as some wires are taped to the face area.    12. The technician will be applying several small electrodes to the scalp, eye area, chin, chest, and legs, plus respiratory effort belts around the chest. Also, there will be a device placed directly under the nose. (THIS WILL NOT OBSTRUCT  YOUR BREATHING.) This is a painless procedure and the skin is not broken.    13. The test is generally completed in six to eight hours; We are usually done between 6 - 7 a.m., unless you are scheduled for a nap study. You may need to come back another night for a second study to complete your treatment plan.    14. Patients who are scheduled for an MSLT (nap study) will stay at the sleep center for the day following their nighttime study. You will be notified if a nap study was ordered for you at the time the night study is scheduled. Generally, patients having a nap study will leave the sleep center by 4 p.m.    15. You will need to bring food for the following day if you are scheduled for a nap study. A refrigerator and microwave are available.    16. A bathroom is available for your use.    17. We are able to adjust the room temperature for your comfort. Please let the technologist know if you are uncomfortable during the study.    18. If you sleep better with a special pillow or stuffed animal, you may bring it along. Service animals are the only live animals permitted.    19. Cable T.V. is available.    20. You will be scheduled for a follow-up appointment three to five days after the sleep study to review your results.    21. A copy of your sleep study is sent to the referring physician approximately two weeks after your study.    22. Any questions can be directed to our staff at [phone].    23. If CPAP therapy is applied, a home unit will be ordered for you through the [email or web address] company. You will be contacted to schedule delivery after insurance authorization.

## 2023-03-17 NOTE — ASSESSMENT & PLAN NOTE
Patient reports that he has been monitoring his O2 saturation with his watch while he sleeps and it will often be in the 80s. He has been told in the past that he stops breathing when he sleeps. He is interested in a sleep study at this time.     STOPBANG - Sleep Apnea Screening    Flowsheet Row Most Recent Value   S - Have you been told that you SNORE? No   T - Are you often TIRED during the day? Yes   O - Do you know if you stop breathing or has anyone witnessed you stop breathing while you were asleep? (OBSTRUCTION) Yes   P - Do you have high blood PRESSURE or on medication to control high blood pressure? No   B - Is your Body Mass Index greater than 35? (BMI) Yes   A - Are you 50 years old or older? (AGE) No   N - Are you a male with a NECK circumference greater than 17 inches, or a female with a neck circumference greater than 16 inches? Yes   G - Are you male? (GENDER) Yes   STOPBANG Total Score 5   LUDMILA Risk High Risk

## 2023-03-24 ENCOUNTER — TELEPHONE (OUTPATIENT)
Dept: HEALTH INFORMATION MANAGEMENT | Facility: OTHER | Age: 28
End: 2023-03-24
Payer: COMMERCIAL

## 2023-03-29 DIAGNOSIS — G47.34 NOCTURNAL HYPOXEMIA: ICD-10-CM

## 2023-03-31 ENCOUNTER — TELEPHONE (OUTPATIENT)
Dept: HEALTH INFORMATION MANAGEMENT | Facility: OTHER | Age: 28
End: 2023-03-31
Payer: COMMERCIAL

## 2023-04-28 ENCOUNTER — HOSPITAL ENCOUNTER (OUTPATIENT)
Dept: LAB | Facility: MEDICAL CENTER | Age: 28
End: 2023-04-28
Attending: NURSE PRACTITIONER
Payer: COMMERCIAL

## 2023-04-28 DIAGNOSIS — M62.82 NON-TRAUMATIC RHABDOMYOLYSIS: ICD-10-CM

## 2023-04-28 LAB
ALBUMIN SERPL BCP-MCNC: 4.5 G/DL (ref 3.2–4.9)
ALBUMIN/GLOB SERPL: 1.7 G/DL
ALP SERPL-CCNC: 86 U/L (ref 30–99)
ALT SERPL-CCNC: 21 U/L (ref 2–50)
ANION GAP SERPL CALC-SCNC: 10 MMOL/L (ref 7–16)
AST SERPL-CCNC: 19 U/L (ref 12–45)
BILIRUB SERPL-MCNC: 0.5 MG/DL (ref 0.1–1.5)
BUN SERPL-MCNC: 16 MG/DL (ref 8–22)
CALCIUM ALBUM COR SERPL-MCNC: 8.9 MG/DL (ref 8.5–10.5)
CALCIUM SERPL-MCNC: 9.3 MG/DL (ref 8.5–10.5)
CHLORIDE SERPL-SCNC: 103 MMOL/L (ref 96–112)
CK SERPL-CCNC: 235 U/L (ref 0–154)
CO2 SERPL-SCNC: 25 MMOL/L (ref 20–33)
CREAT SERPL-MCNC: 0.66 MG/DL (ref 0.5–1.4)
GFR SERPLBLD CREATININE-BSD FMLA CKD-EPI: 133 ML/MIN/1.73 M 2
GLOBULIN SER CALC-MCNC: 2.7 G/DL (ref 1.9–3.5)
GLUCOSE SERPL-MCNC: 87 MG/DL (ref 65–99)
POTASSIUM SERPL-SCNC: 5.4 MMOL/L (ref 3.6–5.5)
PROT SERPL-MCNC: 7.2 G/DL (ref 6–8.2)
SODIUM SERPL-SCNC: 138 MMOL/L (ref 135–145)

## 2023-04-28 PROCEDURE — 80053 COMPREHEN METABOLIC PANEL: CPT

## 2023-04-28 PROCEDURE — 82550 ASSAY OF CK (CPK): CPT

## 2023-04-28 PROCEDURE — 36415 COLL VENOUS BLD VENIPUNCTURE: CPT

## 2024-04-08 ENCOUNTER — RESEARCH ENCOUNTER (OUTPATIENT)
Dept: RESEARCH | Facility: MEDICAL CENTER | Age: 29
End: 2024-04-08
Payer: COMMERCIAL

## 2024-04-15 NOTE — RESEARCH NOTE
After multiple contact attempts regarding the available studies the patient would qualify for. Consent forms remain available for the patient to sign via [employer]. Patient has been notified and provided contact information to schedule if they would like to participate.